# Patient Record
Sex: FEMALE | Race: BLACK OR AFRICAN AMERICAN | NOT HISPANIC OR LATINO | Employment: OTHER | URBAN - METROPOLITAN AREA
[De-identification: names, ages, dates, MRNs, and addresses within clinical notes are randomized per-mention and may not be internally consistent; named-entity substitution may affect disease eponyms.]

---

## 2018-06-06 LAB — POC BETA-HCG (QUAL): NEGATIVE

## 2018-06-11 ENCOUNTER — HISTORICAL (OUTPATIENT)
Dept: WOUND CARE | Facility: HOSPITAL | Age: 48
End: 2018-06-11

## 2018-07-12 ENCOUNTER — HISTORICAL (OUTPATIENT)
Dept: ADMINISTRATIVE | Facility: HOSPITAL | Age: 48
End: 2018-07-12

## 2018-07-12 LAB
BUN SERPL-MCNC: 10 MG/DL (ref 7–18)
CALCIUM SERPL-MCNC: 9.4 MG/DL (ref 8.5–10.1)
CHLORIDE SERPL-SCNC: 108 MMOL/L (ref 98–107)
CO2 SERPL-SCNC: 25 MMOL/L (ref 21–32)
CREAT SERPL-MCNC: 0.7 MG/DL (ref 0.6–1.3)
CREAT/UREA NIT SERPL: 14
ERYTHROCYTE [DISTWIDTH] IN BLOOD BY AUTOMATED COUNT: 15.8 % (ref 11.5–14.5)
GLUCOSE SERPL-MCNC: 89 MG/DL (ref 74–106)
HAV IGM SERPL QL IA: NONREACTIVE
HBV CORE IGM SERPL QL IA: NONREACTIVE
HBV SURFACE AG SERPL QL IA: NEGATIVE
HCT VFR BLD AUTO: 35.9 % (ref 35–46)
HCV AB SERPL QL IA: NONREACTIVE
HGB BLD-MCNC: 11.4 GM/DL (ref 12–16)
HIV 1+2 AB+HIV1 P24 AG SERPL QL IA: NONREACTIVE
MCH RBC QN AUTO: 27.1 PG (ref 26–34)
MCHC RBC AUTO-ENTMCNC: 31.8 GM/DL (ref 31–37)
MCV RBC AUTO: 85.3 FL (ref 80–100)
PLATELET # BLD AUTO: 338 X10(3)/MCL (ref 130–400)
PMV BLD AUTO: 10.7 FL (ref 7.4–10.4)
POTASSIUM SERPL-SCNC: 4.5 MMOL/L (ref 3.5–5.1)
RBC # BLD AUTO: 4.21 X10(6)/MCL (ref 4–5.2)
SODIUM SERPL-SCNC: 139 MMOL/L (ref 136–145)
T PALLIDUM AB SER QL: NONREACTIVE
WBC # SPEC AUTO: 7.6 X10(3)/MCL (ref 4.5–11)

## 2018-07-19 ENCOUNTER — HISTORICAL (OUTPATIENT)
Dept: SURGERY | Facility: HOSPITAL | Age: 48
End: 2018-07-19

## 2018-07-19 LAB
B-HCG SERPL QL: NEGATIVE
POTASSIUM SERPL-SCNC: 3.8 MMOL/L (ref 3.5–5.1)

## 2020-02-17 ENCOUNTER — HISTORICAL (OUTPATIENT)
Dept: ADMINISTRATIVE | Facility: HOSPITAL | Age: 50
End: 2020-02-17

## 2021-02-09 ENCOUNTER — HISTORICAL (OUTPATIENT)
Dept: ADMINISTRATIVE | Facility: HOSPITAL | Age: 51
End: 2021-02-09

## 2021-02-09 LAB
ABS NEUT (OLG): 2.12 X10(3)/MCL (ref 2.1–9.2)
BASOPHILS # BLD AUTO: 0.1 X10(3)/MCL (ref 0–0.2)
BASOPHILS NFR BLD AUTO: 1 %
EOSINOPHIL # BLD AUTO: 0.2 X10(3)/MCL (ref 0–0.9)
EOSINOPHIL NFR BLD AUTO: 3 %
ERYTHROCYTE [DISTWIDTH] IN BLOOD BY AUTOMATED COUNT: 15.4 % (ref 11.5–17)
FOLATE SERPL-MCNC: 12.2 NG/ML (ref 7–31.4)
HCT VFR BLD AUTO: 32 % (ref 37–47)
HGB BLD-MCNC: 9.7 GM/DL (ref 12–16)
IRON SERPL-MCNC: 71 UG/DL (ref 50–170)
LYMPHOCYTES # BLD AUTO: 2.5 X10(3)/MCL (ref 0.6–4.6)
LYMPHOCYTES NFR BLD AUTO: 45 %
MCH RBC QN AUTO: 27.3 PG (ref 27–31)
MCHC RBC AUTO-ENTMCNC: 30.3 GM/DL (ref 33–36)
MCV RBC AUTO: 90.1 FL (ref 80–94)
MONOCYTES # BLD AUTO: 0.7 X10(3)/MCL (ref 0.1–1.3)
MONOCYTES NFR BLD AUTO: 13 %
NEUTROPHILS # BLD AUTO: 2.12 X10(3)/MCL (ref 2.1–9.2)
NEUTROPHILS NFR BLD AUTO: 38 %
PLATELET # BLD AUTO: 296 X10(3)/MCL (ref 130–400)
PMV BLD AUTO: 10.4 FL (ref 9.4–12.4)
RBC # BLD AUTO: 3.55 X10(6)/MCL (ref 4.2–5.4)
VIT B12 SERPL-MCNC: 1117 PG/ML (ref 213–816)
WBC # SPEC AUTO: 5.6 X10(3)/MCL (ref 4.5–11.5)

## 2022-04-07 ENCOUNTER — HISTORICAL (OUTPATIENT)
Dept: ADMINISTRATIVE | Facility: HOSPITAL | Age: 52
End: 2022-04-07
Payer: MEDICAID

## 2022-04-24 VITALS
WEIGHT: 293 LBS | HEIGHT: 63 IN | SYSTOLIC BLOOD PRESSURE: 130 MMHG | BODY MASS INDEX: 51.91 KG/M2 | DIASTOLIC BLOOD PRESSURE: 84 MMHG

## 2022-04-30 NOTE — PROGRESS NOTES
Patient:   Dorita Moran             MRN: [id number]            FIN: [id number]               Age:   48 years     Sex:  Female     :  1970   Associated Diagnoses:   None   Author:   Courtney Her MD      Pre-Op Dx:  48 BF with AUB        [place] [facility] [place]    Reviewed resident's H&P.  Agree with plan.  Proceed with case

## 2022-04-30 NOTE — OP NOTE
Patient:   Dorita Moran             MRN: [id number]            FIN: [id number]               Age:   48 years     Sex:  Female     :  1970   Associated Diagnoses:   None   Author:   Martina Stewart MD      Date of surgery: 2018   Preop diagnosis: AUB-L  Postop diagnosis: same  Procedure: Hysteroscopy Myomectomy, D&C, Proctoscopy, Mirena IUD Insertion  Staff physicians: Courtney Her MD  Resident phyisicians: Martina Stewart MD; Eron Alcaraz MD; Karol Sotomayor MD  Anesthesia: general     IVF: 1000 mL  UOP: 150 mL    EBL: 10 mL  Distension media deficit: 150 mL  Complications: hysteroscopic entry into pararectal space; no rectal defect noted on manual palpation or inspection via proctoscopy  Findings:   EUA: 10-12 week size, anteverted uterus with good mobility and descent. Cervix anterior, nearly flush with vagina. Adequate vaginal capacity.    Hysteroscopy: initial entry with hysteroscope revealed loose areolar tissue and muscle fibers, found to have entered the pararectal space. After re-identification of the cervix, the endometrial cavity contained a 3 cm type I fibroid at the posterior fundus, a 1 cm type I fibroid at the right uterine sidewall, and a 1 cm type 0 fibroid at the left lower uterine segment.  Proctoscopy: no injury or defect of rectal mucosa    Procedure:  The patient was taken to the operating room with IV fluids running. SCDs were placed for DVT prophylaxis. General anesthesia was obtained without difficulty. The patient was prepared and draped in the normal sterile fashion in the dorsal lithotomy position. Red rubber catheritization was performed and the bladder was drained. A time out was performed.    A weighted speculum was inserted into the vagina and the cervix was visualized. A tenaculum was placed on the anterior aspect of the cervix and the cervix was gently dilated to a size 5 Hegar dilators. The hysteroscope was introduced through the cervix under direct  visualization. As noted above, loose areolar tissue and muscle fibers were noted. The vagina and cervix were re-inspected. What was first thought to be the cervix was found to be a small defect in the posterior vagina, tracking into the pararectal space. A rectal exam was performed, with moderate fullness of the pararectal space noted, but no palpable defect was noted and no blood.    The weighted speculum was inserted into the vagina and the cervix was visualized, more proximal and anterior to the previously idenitified defect. A tenaculum was placed on the anterior aspect of the cervix and the cervix was gently dilated to a size 5 Hegar dilators. The 5 mm 30 degree hysteroscope was introduced through the cervix under direct visualization. The uterine cavity was inspected and bilateral tubal ostia were identified. The findings were as above. The hysteroscope was removed and exchanged for the 7 mm, 0 degree Myosure hysteroscope. The cervix was gently dilated further to size 7 Hegar dilator. The hysteroscope with the Myosure device inserted was advanced into the uterine cavity. The type 0 and type 1 fibroids at the lower uterine segment were completely resected, and the the type 1 fundal fibroid was approximately 70% resected until nearly flush with the myometrium. The hysteroscope was removed, and endometrial curettage was performed.    The Mirenea IUD was inserted to the uterine fundus. The strings were trimmed to approximately 3 cm in length. All instruments were removed from the vagina. Tenaculum sites were noted to be hemostatic.    Rectal exam was again performed, and no defect was palpated and no blood noted on glove. The proctoscope was inserted and the rectal mucosa inspected. No defect or blood was seen. The proctoscope was removed.     The patient tolerated the procedure well. Instrument and sponge counts were correct times two. Dr. Her was present and scrubbed for the entire procedure.

## 2022-06-30 ENCOUNTER — HOSPITAL ENCOUNTER (OUTPATIENT)
Dept: RADIOLOGY | Facility: HOSPITAL | Age: 52
Discharge: HOME | End: 2022-06-30
Attending: STUDENT IN AN ORGANIZED HEALTH CARE EDUCATION/TRAINING PROGRAM
Payer: MEDICAID

## 2022-06-30 ENCOUNTER — OFFICE VISIT (OUTPATIENT)
Dept: ORTHOPEDICS | Facility: CLINIC | Age: 52
End: 2022-06-30
Payer: MEDICAID

## 2022-06-30 VITALS — HEIGHT: 63 IN | BODY MASS INDEX: 51.91 KG/M2 | WEIGHT: 293 LBS

## 2022-06-30 DIAGNOSIS — E66.9 OBESITY, UNSPECIFIED CLASSIFICATION, UNSPECIFIED OBESITY TYPE, UNSPECIFIED WHETHER SERIOUS COMORBIDITY PRESENT: ICD-10-CM

## 2022-06-30 DIAGNOSIS — G89.29 CHRONIC PAIN OF LEFT KNEE: ICD-10-CM

## 2022-06-30 DIAGNOSIS — G89.29 CHRONIC PAIN OF LEFT KNEE: Primary | ICD-10-CM

## 2022-06-30 DIAGNOSIS — M25.562 CHRONIC PAIN OF LEFT KNEE: ICD-10-CM

## 2022-06-30 DIAGNOSIS — M25.562 CHRONIC PAIN OF LEFT KNEE: Primary | ICD-10-CM

## 2022-06-30 DIAGNOSIS — M17.12 PRIMARY OSTEOARTHRITIS OF LEFT KNEE: ICD-10-CM

## 2022-06-30 PROCEDURE — 99213 OFFICE O/P EST LOW 20 MIN: CPT | Mod: PBBFAC,25 | Performed by: STUDENT IN AN ORGANIZED HEALTH CARE EDUCATION/TRAINING PROGRAM

## 2022-06-30 PROCEDURE — 99214 OFFICE O/P EST MOD 30 MIN: CPT | Mod: 25,S$PBB,, | Performed by: STUDENT IN AN ORGANIZED HEALTH CARE EDUCATION/TRAINING PROGRAM

## 2022-06-30 PROCEDURE — 1160F RVW MEDS BY RX/DR IN RCRD: CPT | Mod: CPTII,,, | Performed by: STUDENT IN AN ORGANIZED HEALTH CARE EDUCATION/TRAINING PROGRAM

## 2022-06-30 PROCEDURE — 73564 X-RAY EXAM KNEE 4 OR MORE: CPT | Mod: TC,LT

## 2022-06-30 PROCEDURE — 3008F BODY MASS INDEX DOCD: CPT | Mod: CPTII,,, | Performed by: STUDENT IN AN ORGANIZED HEALTH CARE EDUCATION/TRAINING PROGRAM

## 2022-06-30 PROCEDURE — 3008F PR BODY MASS INDEX (BMI) DOCUMENTED: ICD-10-PCS | Mod: CPTII,,, | Performed by: STUDENT IN AN ORGANIZED HEALTH CARE EDUCATION/TRAINING PROGRAM

## 2022-06-30 PROCEDURE — 20610 LARGE JOINT ASPIRATION/INJECTION: L KNEE: ICD-10-PCS | Mod: S$PBB,LT,, | Performed by: STUDENT IN AN ORGANIZED HEALTH CARE EDUCATION/TRAINING PROGRAM

## 2022-06-30 PROCEDURE — 1160F PR REVIEW ALL MEDS BY PRESCRIBER/CLIN PHARMACIST DOCUMENTED: ICD-10-PCS | Mod: CPTII,,, | Performed by: STUDENT IN AN ORGANIZED HEALTH CARE EDUCATION/TRAINING PROGRAM

## 2022-06-30 PROCEDURE — 20610 DRAIN/INJ JOINT/BURSA W/O US: CPT | Mod: PBBFAC,LT | Performed by: STUDENT IN AN ORGANIZED HEALTH CARE EDUCATION/TRAINING PROGRAM

## 2022-06-30 PROCEDURE — 1159F PR MEDICATION LIST DOCUMENTED IN MEDICAL RECORD: ICD-10-PCS | Mod: CPTII,,, | Performed by: STUDENT IN AN ORGANIZED HEALTH CARE EDUCATION/TRAINING PROGRAM

## 2022-06-30 PROCEDURE — 1159F MED LIST DOCD IN RCRD: CPT | Mod: CPTII,,, | Performed by: STUDENT IN AN ORGANIZED HEALTH CARE EDUCATION/TRAINING PROGRAM

## 2022-06-30 PROCEDURE — 99214 PR OFFICE/OUTPT VISIT, EST, LEVL IV, 30-39 MIN: ICD-10-PCS | Mod: 25,S$PBB,, | Performed by: STUDENT IN AN ORGANIZED HEALTH CARE EDUCATION/TRAINING PROGRAM

## 2022-06-30 RX ORDER — LIDOCAINE HYDROCHLORIDE 10 MG/ML
5 INJECTION, SOLUTION EPIDURAL; INFILTRATION; INTRACAUDAL; PERINEURAL
Status: DISCONTINUED | OUTPATIENT
Start: 2022-06-30 | End: 2022-06-30 | Stop reason: HOSPADM

## 2022-06-30 RX ORDER — CARIPRAZINE 3 MG/1
1 CAPSULE, GELATIN COATED ORAL DAILY
COMMUNITY
Start: 2022-06-22

## 2022-06-30 RX ORDER — ESZOPICLONE 1 MG/1
3 TABLET, FILM COATED ORAL
COMMUNITY

## 2022-06-30 RX ORDER — DOXEPIN HYDROCHLORIDE 150 MG/1
150 CAPSULE ORAL NIGHTLY
COMMUNITY
Start: 2022-06-21

## 2022-06-30 RX ORDER — TRIAMCINOLONE ACETONIDE 40 MG/ML
40 INJECTION, SUSPENSION INTRA-ARTICULAR; INTRAMUSCULAR
Status: DISCONTINUED | OUTPATIENT
Start: 2022-06-30 | End: 2022-06-30 | Stop reason: HOSPADM

## 2022-06-30 RX ORDER — LORATADINE 10 MG/1
10 TABLET ORAL NIGHTLY
COMMUNITY
Start: 2022-06-22

## 2022-06-30 RX ORDER — BUPROPION HYDROCHLORIDE 300 MG/1
300 TABLET ORAL
COMMUNITY

## 2022-06-30 RX ORDER — CHLORHEXIDINE GLUCONATE ORAL RINSE 1.2 MG/ML
SOLUTION DENTAL
COMMUNITY
Start: 2022-06-15

## 2022-06-30 RX ORDER — LURASIDONE HYDROCHLORIDE 80 MG/1
80 TABLET, FILM COATED ORAL
Status: ON HOLD | COMMUNITY
End: 2023-12-18 | Stop reason: HOSPADM

## 2022-06-30 RX ORDER — DEXAMETHASONE 4 MG/1
1 TABLET ORAL 2 TIMES DAILY
COMMUNITY
Start: 2022-06-22

## 2022-06-30 RX ORDER — OXCARBAZEPINE 300 MG/1
300 TABLET, FILM COATED ORAL
COMMUNITY

## 2022-06-30 RX ORDER — FLUTICASONE PROPIONATE 50 MCG
1 SPRAY, SUSPENSION (ML) NASAL NIGHTLY
COMMUNITY
Start: 2022-06-22

## 2022-06-30 RX ORDER — HYDROCHLOROTHIAZIDE 25 MG/1
25 TABLET ORAL EVERY MORNING
COMMUNITY
Start: 2022-06-22

## 2022-06-30 RX ORDER — VENLAFAXINE HYDROCHLORIDE 150 MG/1
150 CAPSULE, EXTENDED RELEASE ORAL DAILY
COMMUNITY
Start: 2022-06-21

## 2022-06-30 RX ORDER — CYCLOBENZAPRINE HCL 10 MG
10 TABLET ORAL 3 TIMES DAILY PRN
Status: ON HOLD | COMMUNITY
Start: 2022-05-23 | End: 2023-12-18 | Stop reason: HOSPADM

## 2022-06-30 RX ORDER — FLUTICASONE FUROATE AND VILANTEROL 200; 25 UG/1; UG/1
1 POWDER RESPIRATORY (INHALATION)
COMMUNITY

## 2022-06-30 RX ORDER — MECLIZINE HCL 25MG 25 MG/1
32 TABLET, CHEWABLE ORAL 3 TIMES DAILY PRN
COMMUNITY

## 2022-06-30 RX ORDER — MONTELUKAST SODIUM 10 MG/1
TABLET ORAL
COMMUNITY
Start: 2022-06-22

## 2022-06-30 RX ORDER — ALPRAZOLAM 1 MG/1
1 TABLET ORAL 3 TIMES DAILY
COMMUNITY
Start: 2022-06-23

## 2022-06-30 RX ADMIN — TRIAMCINOLONE ACETONIDE 40 MG: 40 INJECTION, SUSPENSION INTRA-ARTICULAR; INTRAMUSCULAR at 08:06

## 2022-06-30 RX ADMIN — LIDOCAINE HYDROCHLORIDE 5 ML: 10 INJECTION, SOLUTION EPIDURAL; INFILTRATION; INTRACAUDAL; PERINEURAL at 08:06

## 2022-06-30 NOTE — PROGRESS NOTES
"  Subjective:       Existing pt with new compliant   Patient ID: Dorita Moran is a 52 y.o. female. Non-smoker. 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        Chief Complaint: Pain of the Left Knee    HPI:    Left aching, stabbing and throbbing medial, anterior knee pain.   Injury: no known injury  Onset: several months ago worsening over time  Pain level: 9/10  Modifying Factors: worse with activity and increased at night  Associated Symptoms: crepitus and "giving out"   Activity: sedentary with light activity and pain moderately interferes with ADLs   Previous Treatments:  RX NSAIDs without significant relief.  PMH: negative for contraindications for NSAID use  Family History: unknown     Note:  Patient is currently also seen in our clinic for carpal tunnel syndrome      Current Outpatient Medications:     ALPRAZolam (XANAX) 1 MG tablet, Take 1 mg by mouth 3 (three) times daily., Disp: , Rfl:     buPROPion (WELLBUTRIN XL) 300 MG 24 hr tablet, Take 300 mg by mouth., Disp: , Rfl:     chlorhexidine (PERIDEX) 0.12 % solution, SMARTSIG:By Mouth, Disp: , Rfl:     cyclobenzaprine (FLEXERIL) 10 MG tablet, Take 10 mg by mouth 3 (three) times daily as needed., Disp: , Rfl:     doxepin (SINEQUAN) 150 MG Cap, Take 150 mg by mouth nightly., Disp: , Rfl:     eszopiclone (LUNESTA) 1 MG Tab, Take 3 mg by mouth., Disp: , Rfl:     FLOVENT  mcg/actuation inhaler, Inhale 1 puff into the lungs 2 (two) times daily., Disp: , Rfl:     fluticasone furoate-vilanteroL (BREO) 200-25 mcg/dose DsDv diskus inhaler, Inhale 1 puff into the lungs., Disp: , Rfl:     fluticasone propionate (FLONASE) 50 mcg/actuation nasal spray, 1 spray by Each Nostril route nightly., Disp: , Rfl:     hydroCHLOROthiazide (HYDRODIURIL) 25 MG tablet, Take 25 mg by mouth every morning., Disp: , Rfl:     ipratropium-albuteroL (COMBIVENT)  mcg/actuation inhaler, Inhale 1 puff into the lungs., Disp: , Rfl:     " "loratadine (CLARITIN) 10 mg tablet, Take 10 mg by mouth nightly., Disp: , Rfl:     lurasidone (LATUDA) 80 mg Tab tablet, Take 80 mg by mouth., Disp: , Rfl:     meclizine (ANTIVERT) 25 MG tablet, Take 32 mg by mouth 3 (three) times daily as needed., Disp: , Rfl:     montelukast (SINGULAIR) 10 mg tablet, SMARTSI Tablet(s) By Mouth Every Evening, Disp: , Rfl:     OXcarbazepine (TRILEPTAL) 300 MG Tab, Take 300 mg by mouth., Disp: , Rfl:     venlafaxine (EFFEXOR-XR) 150 MG Cp24, Take 150 mg by mouth once daily., Disp: , Rfl:     VRAYLAR 3 mg Cap, Take 1 capsule by mouth once daily., Disp: , Rfl:     Review of patient's allergies indicates:  No Known Allergies    No results found for: LABA1C   Body mass index is 61.89 kg/m².   Vitals:    22 0818   Weight: (!) 158.5 kg (349 lb 6.4 oz)   Height: 5' 3" (1.6 m)   PainSc:   9        ROS   Review of Systems:  A ten-point review of systems was performed and is negative, except as mentioned above.        Objective:      General: well developed; well nourished; cooperative  PSYCH: alert and oriented with appropriate mood and affect  SKIN: inspection and palpation of skin and soft tissue normal;  CV: vascular integrity noted; +2 symmetrical pulses  Resp: Normal work of breathing, quiet breathing    Left knee   Inspection:   Antalgic gait/station; full weight bearing; normal alignment; mild swelling; no erythema; no bruising; Quad deconditioning noted   Palpation:  Medial joint ; Crepitus: present  ROM:   Active, passive Flexion (0-140): 110,110  Active, passive Extension : 0,0  Strength: Flexion 4/5, Extension 4/5  Special Tests:  Ballotable Effusion: Negative  Fluid Wave: Negative   Anterior Drawer: Negative  Lachman: Negative  Pivot Shift: Negative   Posterior Sag Sign: Negative  Posterior Drawer: Negative  Dial Test: not tested   Varus Stress: LCL stable at 0 and 30 degrees   Valgus Stress: MCL stable at 0 and 30 degrees   Patellar Grind: Negative "   Patella Tracking: normal  Patella Crepitation: none  Sosa: Negative  Apleys Compression: Negative  Thessaly: Negative   Noble Compression: not tested  Gabriel: not tested   Neurovascular: Intact; 2+ distal pulse, sensation intact to light touch  Reflexes: 2+          Imaging:  X-ray reviewed and independently interpreted by me.  Discussed with patient.    As per my interpretation of this patient's left knee plain film, there is medial joint space narrowing and marginal osteophyte present tricompartmentally.  No fracture or dislocation noted      Assessment:             1. Chronic pain of left knee    2. Primary osteoarthritis of left knee    3. Obesity, unspecified classification, unspecified obesity type, unspecified whether serious comorbidity present          Plan:       Orders Placed This Encounter    Large Joint Aspiration/Injection: L knee    X-Ray Knee Complete 4 or More Views Left     52 y.o.  patient presenting for evaluation of Pain of the Left Knee   secondary to osteoarthritis complicated by obesity.     moderately exacerbated.   Radiological studies ordered and interpreted by me, my independent interpretation attached, reviewed my findings with patient and showed them their images  CSI performed today to L knee, consented, site marked, time out performed, tolerated well, NVI following injection;  Activity as tolerated, behavior modification encouraged  Formal PT x 12wks, 3 times weekly, HEP daily, Ice/Heat prn, NSAIDs/Tylenol/topical anasthetics PRN, med precautions given,   Follow up 4 months     BMI today: Body mass index is 61.89 kg/m².     Goal BMI: <40    Exercise prescription given to patient to increase fitness and facilitate weight loss. Prescription to include goals of 150 to 300 minutes/week of moderate intensity exercise include activities like brisk walking, light biking or water exercise and/or vigorous activity 75 to 150 minutes/week to include activities like jogging, swimming, fast  bicycling or tennis. They were given goal of 7,500-10,000 steps per day. Muscle strength training was also discussed and recommended goal of 2 to 3 days a week to include activities like activities with elastic bands, body weight exercises or dumbbells.        Large Joint Aspiration/Injection: L knee    Date/Time: 6/30/2022 8:30 AM  Performed by: Sheree Santacruz MD  Authorized by: Sheree Santacruz MD     Consent Done?:  Yes (Written)  Indications:  Pain  Site marked: the procedure site was marked    Timeout: prior to procedure the correct patient, procedure, and site was verified    Prep: patient was prepped and draped in usual sterile fashion      Local anesthesia used?: Yes    Local anesthetic:  Topical anesthetic    Details:  Needle Size:  21 G  Ultrasonic Guidance for needle placement?: No    Approach:  Anterolateral  Location:  Knee  Site:  L knee  Medications:  5 mL LIDOcaine (PF) 10 mg/ml (1%) 10 mg/mL (1 %); 40 mg triamcinolone acetonide 40 mg/mL  Patient tolerance:  Patient tolerated the procedure well with no immediate complications     Procedure: 5 mL of 1% lidocaine plain with 40 mg of Kenalog injected into each location.    RISKS: Possible complications with injection include bleeding, infection (0.01%), pain, allergic reaction to medicine or preservatives within the medicine    Post Procedure: Patient alert, moving all extremities. Good peripheral pulses, no signs of vascular compromise, range of motion intact. Patient tolerated procedure well. Aftercare instructions were given to patient at time of discharge. Relative rest for 3 days - avoiding excessive activity. Place ice on area for 15 minutes every 4 to 6 hours. Tylenol 1000mg twice a day or ibuprofen 600 mg three times per day for next 3-4 days if not on medication already. Avoid excessive activity for next 3 to 4 weeks. ER precautions for fever, severe joint pain, or allergic reaction or other new symptoms related to joint  injection.        Sheree Santacruz MD

## 2022-08-18 ENCOUNTER — HOSPITAL ENCOUNTER (OUTPATIENT)
Dept: RADIOLOGY | Facility: HOSPITAL | Age: 52
Discharge: HOME | End: 2022-08-18
Payer: MEDICAID

## 2022-08-18 DIAGNOSIS — M51.9 LUMBAR DISC DISEASE: Primary | ICD-10-CM

## 2022-08-18 DIAGNOSIS — M51.9 LUMBAR DISC DISEASE: ICD-10-CM

## 2022-09-21 ENCOUNTER — HISTORICAL (OUTPATIENT)
Dept: ADMINISTRATIVE | Facility: HOSPITAL | Age: 52
End: 2022-09-21
Payer: MEDICAID

## 2022-11-01 ENCOUNTER — OFFICE VISIT (OUTPATIENT)
Dept: ORTHOPEDICS | Facility: CLINIC | Age: 52
End: 2022-11-01
Payer: MEDICAID

## 2022-11-01 VITALS
HEART RATE: 85 BPM | BODY MASS INDEX: 55.32 KG/M2 | RESPIRATION RATE: 16 BRPM | WEIGHT: 293 LBS | HEIGHT: 61 IN | SYSTOLIC BLOOD PRESSURE: 110 MMHG | DIASTOLIC BLOOD PRESSURE: 51 MMHG

## 2022-11-01 DIAGNOSIS — E66.9 OBESITY, UNSPECIFIED CLASSIFICATION, UNSPECIFIED OBESITY TYPE, UNSPECIFIED WHETHER SERIOUS COMORBIDITY PRESENT: ICD-10-CM

## 2022-11-01 DIAGNOSIS — G89.29 CHRONIC PAIN OF LEFT KNEE: Primary | ICD-10-CM

## 2022-11-01 DIAGNOSIS — M17.12 PRIMARY OSTEOARTHRITIS OF LEFT KNEE: ICD-10-CM

## 2022-11-01 DIAGNOSIS — M25.562 CHRONIC PAIN OF LEFT KNEE: Primary | ICD-10-CM

## 2022-11-01 PROCEDURE — 20610 DRAIN/INJ JOINT/BURSA W/O US: CPT | Mod: PBBFAC,LT | Performed by: STUDENT IN AN ORGANIZED HEALTH CARE EDUCATION/TRAINING PROGRAM

## 2022-11-01 PROCEDURE — 3078F DIAST BP <80 MM HG: CPT | Mod: CPTII,,, | Performed by: STUDENT IN AN ORGANIZED HEALTH CARE EDUCATION/TRAINING PROGRAM

## 2022-11-01 PROCEDURE — 3078F PR MOST RECENT DIASTOLIC BLOOD PRESSURE < 80 MM HG: ICD-10-PCS | Mod: CPTII,,, | Performed by: STUDENT IN AN ORGANIZED HEALTH CARE EDUCATION/TRAINING PROGRAM

## 2022-11-01 PROCEDURE — 3074F PR MOST RECENT SYSTOLIC BLOOD PRESSURE < 130 MM HG: ICD-10-PCS | Mod: CPTII,,, | Performed by: STUDENT IN AN ORGANIZED HEALTH CARE EDUCATION/TRAINING PROGRAM

## 2022-11-01 PROCEDURE — 99214 PR OFFICE/OUTPT VISIT, EST, LEVL IV, 30-39 MIN: ICD-10-PCS | Mod: 25,S$PBB,, | Performed by: STUDENT IN AN ORGANIZED HEALTH CARE EDUCATION/TRAINING PROGRAM

## 2022-11-01 PROCEDURE — 3074F SYST BP LT 130 MM HG: CPT | Mod: CPTII,,, | Performed by: STUDENT IN AN ORGANIZED HEALTH CARE EDUCATION/TRAINING PROGRAM

## 2022-11-01 PROCEDURE — 99214 OFFICE O/P EST MOD 30 MIN: CPT | Mod: 25,S$PBB,, | Performed by: STUDENT IN AN ORGANIZED HEALTH CARE EDUCATION/TRAINING PROGRAM

## 2022-11-01 PROCEDURE — 1160F PR REVIEW ALL MEDS BY PRESCRIBER/CLIN PHARMACIST DOCUMENTED: ICD-10-PCS | Mod: CPTII,,, | Performed by: STUDENT IN AN ORGANIZED HEALTH CARE EDUCATION/TRAINING PROGRAM

## 2022-11-01 PROCEDURE — 20610: ICD-10-PCS | Mod: S$PBB,LT,, | Performed by: STUDENT IN AN ORGANIZED HEALTH CARE EDUCATION/TRAINING PROGRAM

## 2022-11-01 PROCEDURE — 99215 OFFICE O/P EST HI 40 MIN: CPT | Mod: PBBFAC | Performed by: STUDENT IN AN ORGANIZED HEALTH CARE EDUCATION/TRAINING PROGRAM

## 2022-11-01 PROCEDURE — 1159F MED LIST DOCD IN RCRD: CPT | Mod: CPTII,,, | Performed by: STUDENT IN AN ORGANIZED HEALTH CARE EDUCATION/TRAINING PROGRAM

## 2022-11-01 PROCEDURE — 1159F PR MEDICATION LIST DOCUMENTED IN MEDICAL RECORD: ICD-10-PCS | Mod: CPTII,,, | Performed by: STUDENT IN AN ORGANIZED HEALTH CARE EDUCATION/TRAINING PROGRAM

## 2022-11-01 PROCEDURE — 1160F RVW MEDS BY RX/DR IN RCRD: CPT | Mod: CPTII,,, | Performed by: STUDENT IN AN ORGANIZED HEALTH CARE EDUCATION/TRAINING PROGRAM

## 2022-11-01 RX ADMIN — Medication 20 MG: at 09:11

## 2022-11-01 RX ADMIN — Medication 20 MG: at 08:11

## 2022-11-01 NOTE — PROGRESS NOTES
"  Subjective:       Existing pt, last seen 06/30/2022, prior note reviewed.  Patient received CSI to her knee at the last visit    Patient ID: Dorita Moran is a 52 y.o. female. Non-smoker. 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        Chief Complaint: Pain of the Left Knee and Follow-up    HPI:    Left aching, stabbing and throbbing medial, anterior knee pain.  Patient has steroid injection at her last visit in June 2022, states she had only about 1 month of relief.  She is having excruciating pain that is interfering with ADLs.  She has been attending physical therapy which she states is worsening her knee pain.  Injury: no known injury  Onset: several months ago worsening over time  Pain level: 9/10  Modifying Factors: worse with activity and increased at night  Associated Symptoms: crepitus and "giving out"   Activity: sedentary with light activity and pain moderately interferes with ADLs   Previous Treatments:  RX NSAIDs without significant relief.  PMH: negative for contraindications for NSAID use  Family History: unknown     Note:  Patient is currently also seen in our clinic for carpal tunnel syndrome      Current Outpatient Medications:     ALPRAZolam (XANAX) 1 MG tablet, Take 1 mg by mouth 3 (three) times daily., Disp: , Rfl:     buPROPion (WELLBUTRIN XL) 300 MG 24 hr tablet, Take 300 mg by mouth., Disp: , Rfl:     chlorhexidine (PERIDEX) 0.12 % solution, SMARTSIG:By Mouth, Disp: , Rfl:     cyclobenzaprine (FLEXERIL) 10 MG tablet, Take 10 mg by mouth 3 (three) times daily as needed., Disp: , Rfl:     doxepin (SINEQUAN) 150 MG Cap, Take 150 mg by mouth nightly., Disp: , Rfl:     eszopiclone (LUNESTA) 1 MG Tab, Take 3 mg by mouth., Disp: , Rfl:     FLOVENT  mcg/actuation inhaler, Inhale 1 puff into the lungs 2 (two) times daily., Disp: , Rfl:     fluticasone furoate-vilanteroL (BREO) 200-25 mcg/dose DsDv diskus inhaler, Inhale 1 puff into the lungs., Disp: , Rfl:     " "fluticasone propionate (FLONASE) 50 mcg/actuation nasal spray, 1 spray by Each Nostril route nightly., Disp: , Rfl:     hydroCHLOROthiazide (HYDRODIURIL) 25 MG tablet, Take 25 mg by mouth every morning., Disp: , Rfl:     ipratropium-albuteroL (COMBIVENT)  mcg/actuation inhaler, Inhale 1 puff into the lungs., Disp: , Rfl:     loratadine (CLARITIN) 10 mg tablet, Take 10 mg by mouth nightly., Disp: , Rfl:     lurasidone (LATUDA) 80 mg Tab tablet, Take 80 mg by mouth., Disp: , Rfl:     meclizine (ANTIVERT) 25 MG tablet, Take 32 mg by mouth 3 (three) times daily as needed., Disp: , Rfl:     montelukast (SINGULAIR) 10 mg tablet, SMARTSI Tablet(s) By Mouth Every Evening, Disp: , Rfl:     OXcarbazepine (TRILEPTAL) 300 MG Tab, Take 300 mg by mouth., Disp: , Rfl:     venlafaxine (EFFEXOR-XR) 150 MG Cp24, Take 150 mg by mouth once daily., Disp: , Rfl:     VRAYLAR 3 mg Cap, Take 1 capsule by mouth once daily., Disp: , Rfl:     Review of patient's allergies indicates:   Allergen Reactions    Naproxen Hives    Tramadol Hives and Itching       No results found for: LABA1C   Body mass index is 66.13 kg/m².   Vitals:    22 0828   BP: (!) 110/51   Pulse: 85   Resp: 16   Weight: (!) 158.8 kg (350 lb)   Height: 5' 1" (1.549 m)   PainSc: 10-Worst pain ever        ROS   Review of Systems:  A ten-point review of systems was performed and is negative, except as mentioned above.        Objective:      General: well developed; well nourished; cooperative  PSYCH: alert and oriented with appropriate mood and affect  SKIN: inspection and palpation of skin and soft tissue normal;  CV: vascular integrity noted; +2 symmetrical pulses  Resp: Normal work of breathing, quiet breathing    Left knee   Inspection:   Antalgic gait/station; full weight bearing; normal alignment; mild swelling; no erythema; no bruising; Quad deconditioning noted   Palpation:  Medial joint ; Crepitus: present  ROM:   Active, passive Flexion " (0-140): 110,110  Active, passive Extension : 0,0  Strength: Flexion 4/5, Extension 4/5  Special Tests:  Ballotable Effusion: Negative  Fluid Wave: Negative   Anterior Drawer: Negative  Lachman: Negative  Pivot Shift: Negative   Posterior Sag Sign: Negative  Posterior Drawer: Negative  Dial Test: not tested   Varus Stress: LCL stable at 0 and 30 degrees   Valgus Stress: MCL stable at 0 and 30 degrees   Patellar Grind: Negative   Patella Tracking: normal  Patella Crepitation: none  Sosa: Negative  Apleys Compression: Negative  Thessaly: Negative   Noble Compression: not tested  Gabriel: not tested   Neurovascular: Intact; 2+ distal pulse, sensation intact to light touch  Reflexes: 2+          Imaging:  X-ray reviewed and independently interpreted by me.  Discussed with patient.    As per my interpretation of this patient's left knee plain film, there is medial joint space narrowing and marginal osteophyte present tricompartmentally.  No fracture or dislocation noted      Assessment:             1. Chronic pain of left knee    2. Primary osteoarthritis of left knee    3. Obesity, unspecified classification, unspecified obesity type, unspecified whether serious comorbidity present          Plan:            52 y.o.  patient presenting for evaluation of Pain of the Left Knee and Follow-up   secondary to osteoarthritis complicated by obesity.     moderately exacerbated.   Radiological studies ordered and interpreted by me, my independent interpretation attached, reviewed my findings with patient and showed them their images  VSI performed today to L knee, consented, site marked, time out performed, tolerated well, NVI following injection; patient given Hyalgan injection to the left knee.  See procedure note for details  Activity as tolerated, behavior modification encouraged  Encouraged HEP daily, Ice/Heat prn, NSAIDs/Tylenol/topical anasthetics PRN, med precautions given,   Follow up in 1 week and 2 weeks for Hyalgan  injections 2. And 3.    Of note, patient would like follow-up appointment for her carpal tunnel syndrome she is also currently seen in our clinic for.    BMI today: Body mass index is 66.13 kg/m².     Goal BMI: <40    Exercise prescription given to patient to increase fitness and facilitate weight loss. Prescription to include goals of 150 to 300 minutes/week of moderate intensity exercise include activities like brisk walking, light biking or water exercise and/or vigorous activity 75 to 150 minutes/week to include activities like jogging, swimming, fast bicycling or tennis. They were given goal of 7,500-10,000 steps per day. Muscle strength training was also discussed and recommended goal of 2 to 3 days a week to include activities like activities with elastic bands, body weight exercises or dumbbells.        Large Joint Aspiration/Injection: L knee 1/3    Date/Time: 11/1/2022 9:10 AM  Performed by: Sheree Santacruz MD  Authorized by: Sheree Santacruz MD     Consent Done?:  Yes (Written)  Indications:  Pain and arthritis  Site marked: the procedure site was marked    Timeout: prior to procedure the correct patient, procedure, and site was verified    Prep: patient was prepped and draped in usual sterile fashion      Local anesthesia used?: Yes    Local anesthetic:  Topical anesthetic    Details:  Needle Size:  21 G  Ultrasonic Guidance for needle placement?: No    Approach:  Anterolateral  Location:  Knee  Site:  L knee  Medications:  20 mg sodium hyaluronate (supartz) 10 mg/mL; 20 mg sodium hyaluronate (supartz) 10 mg/mL; 20 mg Hyalgan 10 MG/ML IATC SYRG  Patient tolerance:  Patient tolerated the procedure well with no immediate complications     Procedure: Each joint injected: with Hyalgan 10mg/mL, 2mL.     Post Procedure: Patient reports improvement in pain and ROM. Patient alert, moving all extremities. Good peripheral pulses, no signs of vascular compromise, range of motion intact. Patient tolerated procedure  well. Aftercare instructions were given to patient at time of discharge. Relative rest for 3 days - avoiding excessive activity. Place ice on area for 15 minutes every 4 to 6 hours. Tylenol 1000mg twice a day or ibuprofen 600 mg three times per day for next 3-4 days if not on medication already. Protect the area for the next 1-8 hours if anesthetic was used. Avoid excessive activity for next 3 to 4 weeks. ER precautions for fever, severe joint pain, or allergic reaction or other new symptoms related to joint injection.    Sheree Santacruz MD

## 2022-11-08 ENCOUNTER — PROCEDURE VISIT (OUTPATIENT)
Dept: ORTHOPEDICS | Facility: CLINIC | Age: 52
End: 2022-11-08
Payer: MEDICAID

## 2022-11-08 VITALS — BODY MASS INDEX: 55.32 KG/M2 | WEIGHT: 293 LBS | HEIGHT: 61 IN

## 2022-11-08 DIAGNOSIS — M17.12 PRIMARY OSTEOARTHRITIS OF LEFT KNEE: Primary | ICD-10-CM

## 2022-11-08 PROCEDURE — 99499 UNLISTED E&M SERVICE: CPT | Mod: S$PBB,,, | Performed by: STUDENT IN AN ORGANIZED HEALTH CARE EDUCATION/TRAINING PROGRAM

## 2022-11-08 PROCEDURE — 20610 DRAIN/INJ JOINT/BURSA W/O US: CPT | Mod: PBBFAC,LT | Performed by: STUDENT IN AN ORGANIZED HEALTH CARE EDUCATION/TRAINING PROGRAM

## 2022-11-08 PROCEDURE — 20610 LARGE JOINT ASPIRATION/INJECTION: L KNEE: ICD-10-PCS | Mod: S$PBB,LT,, | Performed by: STUDENT IN AN ORGANIZED HEALTH CARE EDUCATION/TRAINING PROGRAM

## 2022-11-08 PROCEDURE — 99499 NO LOS: ICD-10-PCS | Mod: S$PBB,,, | Performed by: STUDENT IN AN ORGANIZED HEALTH CARE EDUCATION/TRAINING PROGRAM

## 2022-11-08 RX ADMIN — Medication 20 MG: at 08:11

## 2022-11-08 RX ADMIN — Medication 20 MG: at 07:11

## 2022-11-08 NOTE — PROCEDURES
Large Joint Aspiration/Injection: L knee    Date/Time: 11/8/2022 7:30 AM  Performed by: Sheree Santacruz MD  Authorized by: Sheree Santacruz MD     Consent Done?:  Yes (Written)  Indications:  Pain and arthritis  Site marked: the procedure site was marked    Timeout: prior to procedure the correct patient, procedure, and site was verified    Prep: patient was prepped and draped in usual sterile fashion      Local anesthesia used?: Yes    Local anesthetic:  Topical anesthetic    Details:  Needle Size:  21 G  Ultrasonic Guidance for needle placement?: No    Approach:  Anterolateral  Location:  Knee  Site:  L knee  Medications:  20 mg Hyalgan 10 MG/ML IATC SYRG  Patient tolerance:  Patient tolerated the procedure well with no immediate complications     Procedure: Each joint injected: with Hyalgan 10mg/mL, 2mL.     Post Procedure: Patient reports improvement in pain and ROM. Patient alert, moving all extremities. Good peripheral pulses, no signs of vascular compromise, range of motion intact. Patient tolerated procedure well. Aftercare instructions were given to patient at time of discharge. Relative rest for 3 days - avoiding excessive activity. Place ice on area for 15 minutes every 4 to 6 hours. Tylenol 1000mg twice a day or ibuprofen 600 mg three times per day for next 3-4 days if not on medication already. Protect the area for the next 1-8 hours if anesthetic was used. Avoid excessive activity for next 3 to 4 weeks. ER precautions for fever, severe joint pain, or allergic reaction or other new symptoms related to joint injection.

## 2022-11-15 ENCOUNTER — PROCEDURE VISIT (OUTPATIENT)
Dept: ORTHOPEDICS | Facility: CLINIC | Age: 52
End: 2022-11-15
Payer: MEDICAID

## 2022-11-15 VITALS — HEIGHT: 61 IN | BODY MASS INDEX: 55.32 KG/M2 | WEIGHT: 293 LBS

## 2022-11-15 DIAGNOSIS — M17.12 PRIMARY OSTEOARTHRITIS OF LEFT KNEE: Primary | ICD-10-CM

## 2022-11-15 PROCEDURE — 99499 UNLISTED E&M SERVICE: CPT | Mod: S$PBB,,, | Performed by: STUDENT IN AN ORGANIZED HEALTH CARE EDUCATION/TRAINING PROGRAM

## 2022-11-15 PROCEDURE — 99499 NO LOS: ICD-10-PCS | Mod: S$PBB,,, | Performed by: STUDENT IN AN ORGANIZED HEALTH CARE EDUCATION/TRAINING PROGRAM

## 2022-11-15 PROCEDURE — 20610 LARGE JOINT ASPIRATION/INJECTION: L KNEE: ICD-10-PCS | Mod: S$PBB,LT,, | Performed by: STUDENT IN AN ORGANIZED HEALTH CARE EDUCATION/TRAINING PROGRAM

## 2022-11-15 PROCEDURE — 20610 DRAIN/INJ JOINT/BURSA W/O US: CPT | Mod: PBBFAC | Performed by: STUDENT IN AN ORGANIZED HEALTH CARE EDUCATION/TRAINING PROGRAM

## 2022-11-15 RX ADMIN — Medication 20 MG: at 07:11

## 2022-11-15 NOTE — PROCEDURES
Large Joint Aspiration/Injection: L knee    Date/Time: 11/15/2022 7:30 AM  Performed by: Sheree Santacruz MD  Authorized by: Sheree Santacruz MD     Consent Done?:  Yes (Written)  Indications:  Pain and arthritis  Site marked: the procedure site was marked    Timeout: prior to procedure the correct patient, procedure, and site was verified    Prep: patient was prepped and draped in usual sterile fashion      Local anesthesia used?: Yes    Local anesthetic:  Topical anesthetic    Details:  Needle Size:  21 G  Ultrasonic Guidance for needle placement?: No    Approach:  Anterolateral  Location:  Knee  Site:  L knee  Medications:  20 mg Hyalgan 10 MG/ML IATC SYRG  Patient tolerance:  Patient tolerated the procedure well with no immediate complications     Procedure: Each joint injected: with Hyalgan 10mg/mL, 2mL.     Post Procedure: Patient reports improvement in pain and ROM. Patient alert, moving all extremities. Good peripheral pulses, no signs of vascular compromise, range of motion intact. Patient tolerated procedure well. Aftercare instructions were given to patient at time of discharge. Relative rest for 3 days - avoiding excessive activity. Place ice on area for 15 minutes every 4 to 6 hours. Tylenol 1000mg twice a day or ibuprofen 600 mg three times per day for next 3-4 days if not on medication already. Protect the area for the next 1-8 hours if anesthetic was used. Avoid excessive activity for next 3 to 4 weeks. ER precautions for fever, severe joint pain, or allergic reaction or other new symptoms related to joint injection.

## 2022-11-17 ENCOUNTER — OFFICE VISIT (OUTPATIENT)
Dept: ORTHOPEDICS | Facility: CLINIC | Age: 52
End: 2022-11-17
Payer: MEDICAID

## 2022-11-17 VITALS
OXYGEN SATURATION: 100 % | HEART RATE: 69 BPM | RESPIRATION RATE: 20 BRPM | HEIGHT: 61 IN | BODY MASS INDEX: 55.32 KG/M2 | DIASTOLIC BLOOD PRESSURE: 83 MMHG | SYSTOLIC BLOOD PRESSURE: 148 MMHG | WEIGHT: 293 LBS

## 2022-11-17 DIAGNOSIS — G56.03 CARPAL TUNNEL SYNDROME, BILATERAL: Primary | ICD-10-CM

## 2022-11-17 DIAGNOSIS — E66.9 OBESITY, UNSPECIFIED CLASSIFICATION, UNSPECIFIED OBESITY TYPE, UNSPECIFIED WHETHER SERIOUS COMORBIDITY PRESENT: ICD-10-CM

## 2022-11-17 PROCEDURE — 3077F PR MOST RECENT SYSTOLIC BLOOD PRESSURE >= 140 MM HG: ICD-10-PCS | Mod: CPTII,,, | Performed by: STUDENT IN AN ORGANIZED HEALTH CARE EDUCATION/TRAINING PROGRAM

## 2022-11-17 PROCEDURE — 3008F PR BODY MASS INDEX (BMI) DOCUMENTED: ICD-10-PCS | Mod: CPTII,,, | Performed by: STUDENT IN AN ORGANIZED HEALTH CARE EDUCATION/TRAINING PROGRAM

## 2022-11-17 PROCEDURE — 3077F SYST BP >= 140 MM HG: CPT | Mod: CPTII,,, | Performed by: STUDENT IN AN ORGANIZED HEALTH CARE EDUCATION/TRAINING PROGRAM

## 2022-11-17 PROCEDURE — 99214 OFFICE O/P EST MOD 30 MIN: CPT | Mod: 25,S$PBB,, | Performed by: STUDENT IN AN ORGANIZED HEALTH CARE EDUCATION/TRAINING PROGRAM

## 2022-11-17 PROCEDURE — 3079F PR MOST RECENT DIASTOLIC BLOOD PRESSURE 80-89 MM HG: ICD-10-PCS | Mod: CPTII,,, | Performed by: STUDENT IN AN ORGANIZED HEALTH CARE EDUCATION/TRAINING PROGRAM

## 2022-11-17 PROCEDURE — 1159F PR MEDICATION LIST DOCUMENTED IN MEDICAL RECORD: ICD-10-PCS | Mod: CPTII,,, | Performed by: STUDENT IN AN ORGANIZED HEALTH CARE EDUCATION/TRAINING PROGRAM

## 2022-11-17 PROCEDURE — 1160F PR REVIEW ALL MEDS BY PRESCRIBER/CLIN PHARMACIST DOCUMENTED: ICD-10-PCS | Mod: CPTII,,, | Performed by: STUDENT IN AN ORGANIZED HEALTH CARE EDUCATION/TRAINING PROGRAM

## 2022-11-17 PROCEDURE — 1159F MED LIST DOCD IN RCRD: CPT | Mod: CPTII,,, | Performed by: STUDENT IN AN ORGANIZED HEALTH CARE EDUCATION/TRAINING PROGRAM

## 2022-11-17 PROCEDURE — 99214 PR OFFICE/OUTPT VISIT, EST, LEVL IV, 30-39 MIN: ICD-10-PCS | Mod: 25,S$PBB,, | Performed by: STUDENT IN AN ORGANIZED HEALTH CARE EDUCATION/TRAINING PROGRAM

## 2022-11-17 PROCEDURE — 3079F DIAST BP 80-89 MM HG: CPT | Mod: CPTII,,, | Performed by: STUDENT IN AN ORGANIZED HEALTH CARE EDUCATION/TRAINING PROGRAM

## 2022-11-17 PROCEDURE — 20526 THER INJECTION CARP TUNNEL: CPT | Mod: 50,PBBFAC | Performed by: STUDENT IN AN ORGANIZED HEALTH CARE EDUCATION/TRAINING PROGRAM

## 2022-11-17 PROCEDURE — 1160F RVW MEDS BY RX/DR IN RCRD: CPT | Mod: CPTII,,, | Performed by: STUDENT IN AN ORGANIZED HEALTH CARE EDUCATION/TRAINING PROGRAM

## 2022-11-17 PROCEDURE — 20526 CARPAL TUNNEL: ICD-10-PCS | Mod: 50,S$PBB,, | Performed by: STUDENT IN AN ORGANIZED HEALTH CARE EDUCATION/TRAINING PROGRAM

## 2022-11-17 PROCEDURE — 99214 OFFICE O/P EST MOD 30 MIN: CPT | Mod: PBBFAC,25 | Performed by: STUDENT IN AN ORGANIZED HEALTH CARE EDUCATION/TRAINING PROGRAM

## 2022-11-17 PROCEDURE — 3008F BODY MASS INDEX DOCD: CPT | Mod: CPTII,,, | Performed by: STUDENT IN AN ORGANIZED HEALTH CARE EDUCATION/TRAINING PROGRAM

## 2022-11-17 RX ORDER — TRIAMCINOLONE ACETONIDE 40 MG/ML
40 INJECTION, SUSPENSION INTRA-ARTICULAR; INTRAMUSCULAR
Status: COMPLETED | OUTPATIENT
Start: 2022-11-17 | End: 2022-11-17

## 2022-11-17 RX ORDER — TRIAMCINOLONE ACETONIDE 40 MG/ML
40 INJECTION, SUSPENSION INTRA-ARTICULAR; INTRAMUSCULAR ONCE
Status: COMPLETED | OUTPATIENT
Start: 2022-11-17 | End: 2022-11-17

## 2022-11-17 RX ADMIN — TRIAMCINOLONE ACETONIDE 40 MG: 40 INJECTION, SUSPENSION INTRA-ARTICULAR; INTRAMUSCULAR at 07:11

## 2022-11-17 NOTE — PROGRESS NOTES
"  Subjective:       Existing pt, last seen March 2022    Patient ID: Dorita Moran is a Right dominate 52 y.o. female. Non-smoker. Employment HX: disabled. Used to be a cook in fast food.         Chief Complaint: Pain and Numbness of the Left Hand and Pain and Numbness of the Right Hand    HPI:    52 Years RHD Female presents to [facility] for follow up visit for bilateral hand numbness and tingling, left worse than right. Pt given CSI at last visit in march to b/l CTS.  Her hands were not bothering her for many months after last steroid injection however over the last month she has had significant increase in hand pain as well as paresthesias both hands.    Onset: insidious over years. States she knows she has a "pinched nerve in her left elbow."  No prior injuries. Patient states she has worsening tingling and pain in her second through fifth fingers bilaterally worse on the left. She also states she has a cold sensation that runs from her elbow into her fingers on her left side.  Previously seen PCP and Dr. Mccormack for left EMG in Feb 2022.  Previous treatment:wrist bracing and gabapentin 800 mg tID ( not helping).  Carpal tunnel CSI last in March 2022, at least 3 months of decreased pain  Current pain level: 9/10 (rated as worsening)   Modifying factors: worse with activity;   Associated Symptoms: numbness/tingling ; no swelling; no skin changes; weakness of ; no decrease in ROM  Activity: sedentary, not full ADLs, pain interfering with ADLs; pain interferes with function/daily activity "moderately"        Note:       Current Outpatient Medications:     ALPRAZolam (XANAX) 1 MG tablet, Take 1 mg by mouth 3 (three) times daily., Disp: , Rfl:     buPROPion (WELLBUTRIN XL) 300 MG 24 hr tablet, Take 300 mg by mouth., Disp: , Rfl:     chlorhexidine (PERIDEX) 0.12 % solution, SMARTSIG:By Mouth, Disp: , Rfl:     cyclobenzaprine (FLEXERIL) 10 MG tablet, Take 10 mg by mouth 3 (three) times daily as " "needed., Disp: , Rfl:     doxepin (SINEQUAN) 150 MG Cap, Take 150 mg by mouth nightly., Disp: , Rfl:     eszopiclone (LUNESTA) 1 MG Tab, Take 3 mg by mouth., Disp: , Rfl:     FLOVENT  mcg/actuation inhaler, Inhale 1 puff into the lungs 2 (two) times daily., Disp: , Rfl:     fluticasone furoate-vilanteroL (BREO) 200-25 mcg/dose DsDv diskus inhaler, Inhale 1 puff into the lungs., Disp: , Rfl:     fluticasone propionate (FLONASE) 50 mcg/actuation nasal spray, 1 spray by Each Nostril route nightly., Disp: , Rfl:     hydroCHLOROthiazide (HYDRODIURIL) 25 MG tablet, Take 25 mg by mouth every morning., Disp: , Rfl:     ipratropium-albuteroL (COMBIVENT)  mcg/actuation inhaler, Inhale 1 puff into the lungs., Disp: , Rfl:     loratadine (CLARITIN) 10 mg tablet, Take 10 mg by mouth nightly., Disp: , Rfl:     lurasidone (LATUDA) 80 mg Tab tablet, Take 80 mg by mouth., Disp: , Rfl:     meclizine (ANTIVERT) 25 MG tablet, Take 32 mg by mouth 3 (three) times daily as needed., Disp: , Rfl:     montelukast (SINGULAIR) 10 mg tablet, SMARTSI Tablet(s) By Mouth Every Evening, Disp: , Rfl:     OXcarbazepine (TRILEPTAL) 300 MG Tab, Take 300 mg by mouth., Disp: , Rfl:     venlafaxine (EFFEXOR-XR) 150 MG Cp24, Take 150 mg by mouth once daily., Disp: , Rfl:     VRAYLAR 3 mg Cap, Take 1 capsule by mouth once daily., Disp: , Rfl:     Current Facility-Administered Medications:     triamcinolone acetonide injection 40 mg, 40 mg, Intra-articular, Once, Sheree Santacruz MD    triamcinolone acetonide injection 40 mg, 40 mg, Intra-articular, 1 time in Clinic/HOD, Sheree Santacruz MD    Review of patient's allergies indicates:   Allergen Reactions    Naproxen Hives    Tramadol Hives and Itching       No results found for: LABA1C  Body mass index is 66.13 kg/m².   Vitals:    22 0730   BP: (!) 148/83   Pulse: 69   Resp: 20   SpO2: 100%   Weight: (!) 158.8 kg (350 lb)   Height: 5' 1" (1.549 m)   PainSc:   9        ROS  Review of " Systems:  A ten-point review of systems was performed and is negative, except as mentioned above.        Objective:      General: well developed; well nourished; cooperative  PSYCH: alert and oriented with appropriate mood and affect  SKIN: inspection and palpation of skin and soft tissue normal;  CV: vascular integrity noted; +2 symmetrical pulses  Resp: Normal work of breathing, quiet breathing    MSK exam Left hand     Inspection: no skin changes; no swelling; no atrophy; no deformity noted.   Palpation: no mass noted;no TTP; no crepitus; no joint effusion   ROM:   Full range of motion of the MCP, PIP and DIP    Neurovascular:   Sensation: two point discrimination intact   Motor:   Radial nerve: IP joint extension against resistance intact   Median nerve:   recurrent motor branch: Palmar abduction of thumb intact   anterior interosseous branch: Ok sign intact   Ulnar nerve: abduction of fingers against resistance intact   Vascular: pulses 2+, Allens test intact, capillary refill 2 seconds   Special tests:   Basilar thumb arthritis: Grind test: negative   De Quervain's tenosynovitis: Finkelstein's test negative, Eichhoff test negative   Dupuytren's: table top test negative   Stability: Newman's test negative, Lunotriquetral ballottement test negative, Fovea sign negative, TFCC compression test negative   UCL ligament test negative   Nerve tests: Tinel wrist positive, Tinel elbow positive, Phalen's positive, Flick negative, Froment's sign negative, Wartenberg negative, Carlie's sign negative    MSK exam Right hand     Inspection: no skin changes; no swelling; no atrophy; no deformity noted.   Palpation: no mass noted;no TTP; no crepitus; no joint effusion   ROM:   full ROM at MCP, PIP and DIP    Neurovascular:   Sensation: two point discrimination diminished bilaterally  Motor:   Radial nerve: IP joint extension against resistance intact   Median nerve:   recurrent motor branch: Palmar abduction of thumb intact    anterior interosseous branch: Ok sign intact   Ulnar nerve: abduction of fingers against resistance intact   Vascular: pulses 2+, Allens test intact, capillary refill 2 seconds   Special tests:   Basilar thumb arthritis: Grind test: negative   De Quervain's tenosynovitis: Finkelstein's test negative, Eichhoff test negative   Dupuytren's: table top test negative   Stability: Newman's test negative, Lunotriquetral ballottement test negative, Fovea sign negative, TFCC compression test negative   UCL ligament test negative   Nerve tests: Tinel wrist positive, Tinel elbow negative, Phalen's positive, Flick negative, Froment's sign negative, Wartenberg negative, Carlie's sign negative    As per review of this patient's left upper extremity EMG from 1/6/2022 the patient was found to have moderate to severe left carpal tunnel syndrome, mild to moderate left ulnar neuropathy at the wrist and elbow as well as sensorimotor polyneuropathy            Assessment:           1. Carpal tunnel syndrome, bilateral    2. Obesity, unspecified classification, unspecified obesity type, unspecified whether serious comorbidity present          Plan:       Orders Placed This Encounter    Carpal Tunnel    triamcinolone acetonide injection 40 mg    triamcinolone acetonide injection 40 mg   52 Years old patient new to sports medicine clinic for evaluation of bilateral upper extremity pain especially in the hands, left worse than right, likely secondary to carpal tunnel syndrome and cubital tunnel syndrome.      Moderate chronic exacerbation  EMG reviewed by myself  CSI performed today to bilateral carpal Tunnel, consented, tolerated well, NVI following injection and improvement in symptoms;  Activity as tolerated, behavior modification encouraged, patient will continue her left wrist splints at night and she was provided with right wrist splint for nighttime use  Encouraged HEP daily, Ice/Heat prn, NSAIDs/Tylenol/topical anasthetics PRN,  patient encouraged to speak with her PCP about continuing or discontinuing gabapentin, med precautions given,     Follow up 4 months, repeat assessment    Sheree Santacruz MD    Carpal Tunnel    Date/Time: 11/17/2022 7:50 AM  Performed by: Sheree Santacruz MD  Authorized by: Sheree Santacruz MD     Consent Done?:  Yes (Written)  Indications:  Pain  Site marked: the procedure site was marked    Timeout: prior to procedure the correct patient, procedure, and site was verified    Prep: patient was prepped and draped in usual sterile fashion      Local anesthesia used?: Yes    Local anesthetic:  Topical anesthetic  Location:  Wrist (L)  Site:  R carpal tunnel and L carpal tunnel  Needle size:  25 G (21G)  Approach:  Volar  Medications:  40 mg (KENALOG-40) injection 40 mg/mL; 40 mg (KENALOG-40) injection 40 mg/mL  Patient tolerance:  Patient tolerated the procedure well with no immediate complications     Additional Comments: Description: The patient was prepped using Chlorhexidine scrub and appropriate identification of anatomic landmarks found by ultrasound. Topical anesthetic of ethyl chloride was used. Sterile needle used (size # 25 gauge, length 1 inch) 40 mg of Kenalog injected into each area.    Complications: None   EBL: None    Post Procedure: Patient reports improvement in pain and ROM. Patient alert, moving all extremities. Good peripheral pulses, no signs of vascular compromise, range of motion intact. Patient tolerated procedure well. Aftercare instructions were given to patient at time of discharge. Relative rest for 3 days - avoiding excessive activity. Place ice on area for 15 minutes every 4 to 6 hours. Tylenol 1000mg twice a day or ibuprofen 600 mg three times per day for next 3-4 days if not on medication already. Protect the area for the next 1-8 hours if anesthetic was used. Avoid excessive activity for next 3 to 4 weeks. ER precautions for fever, severe joint pain, or allergic reaction or other new  symptoms related to joint injection.

## 2023-02-07 ENCOUNTER — HOSPITAL ENCOUNTER (OUTPATIENT)
Dept: RADIOLOGY | Facility: HOSPITAL | Age: 53
Discharge: HOME | End: 2023-02-07
Attending: STUDENT IN AN ORGANIZED HEALTH CARE EDUCATION/TRAINING PROGRAM
Payer: MEDICAID

## 2023-02-07 ENCOUNTER — OFFICE VISIT (OUTPATIENT)
Dept: ORTHOPEDICS | Facility: CLINIC | Age: 53
End: 2023-02-07
Payer: MEDICAID

## 2023-02-07 VITALS
HEART RATE: 87 BPM | HEIGHT: 62 IN | SYSTOLIC BLOOD PRESSURE: 147 MMHG | WEIGHT: 293 LBS | DIASTOLIC BLOOD PRESSURE: 75 MMHG | BODY MASS INDEX: 53.92 KG/M2

## 2023-02-07 DIAGNOSIS — G56.03 CARPAL TUNNEL SYNDROME, BILATERAL: Primary | ICD-10-CM

## 2023-02-07 DIAGNOSIS — E66.01 CLASS 3 SEVERE OBESITY WITH BODY MASS INDEX (BMI) OF 60.0 TO 69.9 IN ADULT, UNSPECIFIED OBESITY TYPE, UNSPECIFIED WHETHER SERIOUS COMORBIDITY PRESENT: ICD-10-CM

## 2023-02-07 DIAGNOSIS — G56.03 CARPAL TUNNEL SYNDROME, BILATERAL: ICD-10-CM

## 2023-02-07 PROBLEM — F32.A DEPRESSION: Status: ACTIVE | Noted: 2023-02-07

## 2023-02-07 PROBLEM — J45.909 ASTHMA: Status: ACTIVE | Noted: 2023-02-07

## 2023-02-07 PROCEDURE — 73130 X-RAY EXAM OF HAND: CPT | Mod: TC,RT

## 2023-02-07 PROCEDURE — 73130 X-RAY EXAM OF HAND: CPT | Mod: TC,LT

## 2023-02-07 PROCEDURE — 99214 OFFICE O/P EST MOD 30 MIN: CPT | Mod: PBBFAC

## 2023-02-07 RX ORDER — DIETHYLPROPION HYDROCHLORIDE 75 MG/1
75 TABLET, EXTENDED RELEASE ORAL EVERY MORNING
COMMUNITY
Start: 2023-01-19

## 2023-02-07 RX ORDER — GABAPENTIN 800 MG/1
800 TABLET ORAL 4 TIMES DAILY
COMMUNITY
Start: 2023-01-15

## 2023-02-07 NOTE — PROGRESS NOTES
Faculty Attestation: Dorita Moran  was seen in Sports Medicine Clinic. Patient seen and evaluated at the time of the visit. History of Present Illness, Physical Exam, and Assessment and Plan reviewed. Treatment plan is reasonable and appropriate. Compliance with treatment recommendations is important.  Radiology images independently reviewed and agree with radiologist interpretation. No procedure was performed.     Sarah Marcos MD  Family/Sports Medicine

## 2023-02-07 NOTE — PROGRESS NOTES
"Subjective:    Patient ID: Dorita Moran is a right handed 52 y.o. female  who presented to [facility] for follow up.      Chief Complaint: Pain of the Right Hand and Pain of the Left Hand      History of Present Illness:    Dorita Moran Is a 52-year-old female who presents today for follow-up of bilateral hand pain, numbness, and tingling.  She has been experiencing this pain for many years.  She was last seen on 11/17/2022.  At that time, she received bilateral CSI to her carpal tunnels.  She additionally had CSI 2 bilateral carpal tunnel 03/29/2022.  She says that these injections lasted for few weeks but her symptoms returned.    Additionally, she has been  taking Aleve, using night splints, and has done physical therapy which have not provided significant relief.  She completed physical therapy 11/2022.  She is interested in discussing surgery today.    Hand Review of Systems:  Swelling?  no  Instability?  no  Clicking?  no  Limited ROM? no  Numbness/Tingling? yes  Weakness? yes       Objective:      Physical Exam:    BP (!) 147/75   Pulse 87   Ht 5' 2" (1.575 m)   Wt (!) 156.2 kg (344 lb 6.4 oz)   BMI 62.99 kg/m²     Appearance:  Soft tissue swelling: Left: no Right: no  Effusion: Left:  Negative Right: Negative  Erythema: Left no Right: no  Ecchymosis: Left: no Right: no  Atrophy: Left: no Right: no    Palpation:  Hand/wrist Tenderness: Left: none  Right: none    Range of motion:  Flexion (0-80): Left:  80 Right: 80  Extension (0-70): Left:  70 Right: 70  Ulnar deviation (0-30): 30 Right: 30  Radial deviation (0-20): 20 Right: 20  Supination (0-90): Left: 90 Right: 90  Pronation (0-90): Left: 90 Right: 90  Able to make a power fist and claw hand: on Both hand(s)  Distal palmar crease-finger tip distance: 0 on Both hand(s)    Strength:  Flexion: Left: 5/5 Pain: no Right: 5/5 Pain: no  Extension: Left: 5/5 Pain: no Right: 5/5 Pain: no  Supination: Left: " 5/5 Pain: no Right: 5/5 Pain: no  Pronation: Left: 5/5 Pain: no Right: 5/5 Pain: no  Ulnar deviation: Left: 5/5 Pain: no Right: 5/5 Pain: no  Radial deviation: Left: 5/5 Pain: no Right: 5/5 Pain: no    Special Tests:  Durkans Test (Carpal Compression test): Left: Positive  Right: Positive  Tinels:  Left: Positive  Right: Positive    Phalens: Left: Positive  Right: Positive    FDP test: Left: Negative  Right: Negative  FDS test: Left: Negative  Right: Negative    AIN/PIN/Radial nerve: Intact and symmetric    General appearance: NAD  Peripheral pulses: normal bilaterally   Sensation: diminished    Labs:  Last A1c: 6.0     Imaging:   Previous images not done.  X-rays ordered and performed today: yes  # of views: 3 Laterality: bilateral  My Interpretation:  Distal Radial ulnar joint space is overall Normal on AP views. Scapholunate interval distance is Normal on bilateral AP views. A DISI/VISI is not suggested on bilateral hand series. Negative/positive ulnar variance is not suggested on lbilateral lateral views.  no fracture, dislocation, swelling or degenerative changes noted      Assessment:      Encounter Diagnoses   Code Name Primary?    G56.03 Carpal tunnel syndrome, bilateral Yes    E66.01, Z68.44 Class 3 severe obesity with body mass index (BMI) of 60.0 to 69.9 in adult, unspecified obesity type, unspecified whether serious comorbidity present         Plan:     Dx: bilateral  carpal tunnel syndrome with failed conservative therapy including NSAIDs, CSI, and PT  Treatment Plan: Discussed with patient diagnosis and treatment recommendations.   Natural history and expected course discussed. Questions answered.  Educational material distributed.  Reduction in offending activity.  Gentle ROM exercises.  Discussed with patient her options of continued conservative therapy or pursuing surgery.  Patient is agreeable surgical intervention as she has had multiple CSI size without significant relief, failed PT, and no  relief from wrist splint.  Will have patient follow up with Orthopedic surgery for carpal tunnel release.  Continue to use  carpal tunnel splints at nighttime.  They can also be used during the day if they provide relief.  Rest, ice, compression, and elevation (RICE) therapy.  Plain film x-rays.  Home physical therapy exercise handouts provided to patient.   Over the counter NSAID and/or tylenol provided you do not have contraindications such as but not limited to liver or kidney disease or uncontrolled blood pressure. If you're doctors have told you to to not take them based on your health, do not take them.   Imaging: radiological studies ordered and independently reviewed; discussed with patient; pending radiologist interpretation.   Procedure: Discussed CSI  As possible treatment option.  However, patient has failed to CSI eyes in the past and does not wish to have further injections at this time.  Activity: Activity as tolerated  Therapy: No formal therapy  Medication: first line treatment with daily acetaminophen. Up to 1000 mg three times daily can be taken; medication precautions given.. Please see your primary care physician for further refills.  RTC: 2 weeks with ortho surgery.

## 2023-02-20 ENCOUNTER — OFFICE VISIT (OUTPATIENT)
Dept: ORTHOPEDICS | Facility: CLINIC | Age: 53
End: 2023-02-20
Payer: MEDICAID

## 2023-02-20 VITALS
OXYGEN SATURATION: 99 % | SYSTOLIC BLOOD PRESSURE: 122 MMHG | BODY MASS INDEX: 53.92 KG/M2 | HEIGHT: 62 IN | WEIGHT: 293 LBS | HEART RATE: 85 BPM | RESPIRATION RATE: 20 BRPM | DIASTOLIC BLOOD PRESSURE: 79 MMHG

## 2023-02-20 DIAGNOSIS — G56.03 CARPAL TUNNEL SYNDROME, BILATERAL: Primary | ICD-10-CM

## 2023-02-20 PROCEDURE — 3078F DIAST BP <80 MM HG: CPT | Mod: CPTII,,, | Performed by: STUDENT IN AN ORGANIZED HEALTH CARE EDUCATION/TRAINING PROGRAM

## 2023-02-20 PROCEDURE — 3074F SYST BP LT 130 MM HG: CPT | Mod: CPTII,,, | Performed by: STUDENT IN AN ORGANIZED HEALTH CARE EDUCATION/TRAINING PROGRAM

## 2023-02-20 PROCEDURE — 3078F PR MOST RECENT DIASTOLIC BLOOD PRESSURE < 80 MM HG: ICD-10-PCS | Mod: CPTII,,, | Performed by: STUDENT IN AN ORGANIZED HEALTH CARE EDUCATION/TRAINING PROGRAM

## 2023-02-20 PROCEDURE — 1159F PR MEDICATION LIST DOCUMENTED IN MEDICAL RECORD: ICD-10-PCS | Mod: CPTII,,, | Performed by: STUDENT IN AN ORGANIZED HEALTH CARE EDUCATION/TRAINING PROGRAM

## 2023-02-20 PROCEDURE — 99213 PR OFFICE/OUTPT VISIT, EST, LEVL III, 20-29 MIN: ICD-10-PCS | Mod: S$PBB,,, | Performed by: STUDENT IN AN ORGANIZED HEALTH CARE EDUCATION/TRAINING PROGRAM

## 2023-02-20 PROCEDURE — 4010F ACE/ARB THERAPY RXD/TAKEN: CPT | Mod: CPTII,,, | Performed by: STUDENT IN AN ORGANIZED HEALTH CARE EDUCATION/TRAINING PROGRAM

## 2023-02-20 PROCEDURE — 1159F MED LIST DOCD IN RCRD: CPT | Mod: CPTII,,, | Performed by: STUDENT IN AN ORGANIZED HEALTH CARE EDUCATION/TRAINING PROGRAM

## 2023-02-20 PROCEDURE — 3008F PR BODY MASS INDEX (BMI) DOCUMENTED: ICD-10-PCS | Mod: CPTII,,, | Performed by: STUDENT IN AN ORGANIZED HEALTH CARE EDUCATION/TRAINING PROGRAM

## 2023-02-20 PROCEDURE — 4010F PR ACE/ARB THEARPY RXD/TAKEN: ICD-10-PCS | Mod: CPTII,,, | Performed by: STUDENT IN AN ORGANIZED HEALTH CARE EDUCATION/TRAINING PROGRAM

## 2023-02-20 PROCEDURE — 3074F PR MOST RECENT SYSTOLIC BLOOD PRESSURE < 130 MM HG: ICD-10-PCS | Mod: CPTII,,, | Performed by: STUDENT IN AN ORGANIZED HEALTH CARE EDUCATION/TRAINING PROGRAM

## 2023-02-20 PROCEDURE — 99215 OFFICE O/P EST HI 40 MIN: CPT | Mod: PBBFAC

## 2023-02-20 PROCEDURE — 3008F BODY MASS INDEX DOCD: CPT | Mod: CPTII,,, | Performed by: STUDENT IN AN ORGANIZED HEALTH CARE EDUCATION/TRAINING PROGRAM

## 2023-02-20 PROCEDURE — 99213 OFFICE O/P EST LOW 20 MIN: CPT | Mod: S$PBB,,, | Performed by: STUDENT IN AN ORGANIZED HEALTH CARE EDUCATION/TRAINING PROGRAM

## 2023-02-20 RX ORDER — LOSARTAN POTASSIUM AND HYDROCHLOROTHIAZIDE 25; 100 MG/1; MG/1
1 TABLET ORAL
COMMUNITY
Start: 2023-02-15

## 2023-02-20 RX ORDER — PREDNISONE 20 MG/1
20 TABLET ORAL
COMMUNITY
Start: 2023-02-10

## 2023-02-20 NOTE — PROGRESS NOTES
[facility] Orthopaedic Surgery H&P Note    In brief, Dorita Moran is a 52 y.o. female with bilateral hand pain and numbness    HPI:  This is a right-hand dominant 52-year-old female who presents today for evaluation of longstanding history bilateral hand numbness and weakness and pain.  She says that over 2 years ago she started having numbness in both of her hands.  Has progressed.  She has done nighttime splinting which no longer alleviate symptoms she has bilateral injections with corticosteroids in her carpal tunnels which no longer provide relief.  She is describes the pain is pins and needles in all of her fingers she has to wake up at night to shake her hands out.  Over the past few months it has started radiating from her neck down to her shoulder and elbow and all the way down to her hand on the right-hand side.  He is taking some over-the-counter anti-inflammatories.  She has a history of back pain.  She is currently on disability.  She says she is a nonsmoker.    PMH:   Past Medical History:   Diagnosis Date    Anxiety     Asthma     Hypertension        PSH:   Past Surgical History:   Procedure Laterality Date    APPENDECTOMY      BARIATRIC SURGERY       SECTION         SH:   Social History     Socioeconomic History    Marital status: Single   Tobacco Use    Smoking status: Never    Smokeless tobacco: Never   Substance and Sexual Activity    Alcohol use: Never    Drug use: Never       FH:   Family History   Family history unknown: Yes       Allergies:   Review of patient's allergies indicates:   Allergen Reactions    Naproxen Hives    Tramadol Hives and Itching       ROS:  Constitutional- no fever, fatigue, weakness  Eye- no vision loss, eye redness, drainage, or pain  ENMT- no sore throat, ear pain, sinus pain/congestion, nasal congestion/drainage  Respiratory- no cough, wheezing, or shortness of breath  CV- no chest pain, no palpitations, no edema  GI- no N/V/D; no abdominal pain    Physical  Exam:  Vitals:    02/20/23 0836   BP: 122/79   Pulse: 85   Resp: 20       General: NAD  Cardio: RRR by peripheral pulse  Pulm: Normal WOB on room air, symmetric chest rise  Abd: Soft, NT/ND    MSK:  Bilateral upper extremity exam     Positive Tinel's at the carpal tunnel   Positive Durkan's carpal tunnel compression test bilateral   Positive Tinel's over the cubital tunnel   Numbness and tingling and small finger with elbow flexion and shoulder abduction   Positive Spurling's right upper extremity  Deltoid 5/5   Bicep 5/5   Triceps 5/5   Wrist flexion/extension 5/5   Interosseous muscles 5/5   Sensation intact to light touch median/ulnar/radial nerve distribution   Negative Darian's exam   Both hands warm and well-perfused       Imaging:   Independent review of radiographs now show any acute osseous injury    Assessment:  52-year-old female with bilateral hand numbness chronic     -we had a long discussion that she likely has some aspect of carpal and cubital tunnel bilaterally.  However, given her history of radiating radicular pain from her neck, I think this warrants that she brings her EMG which she had done at [facility] before discussing surgical intervention   -we will see her back with the EMG results and discuss further at her next visit  -until then, we went over the importance of wearing her splints for her tunnel syndrome  -okay to weightbear as tolerated        Dr. Dank Colby  [facility]

## 2023-02-20 NOTE — PROGRESS NOTES
Faculty Attestation: Dorita Moran  was seen at [facility] in the Orthopaedic Clinic. Patient seen and evaluated at the time of the visit. History of Present Illness, Physical Exam, and Assessment and Plan reviewed. Treatment plan is reasonable and appropriate. Compliance with treatment recommendations is important. No procedure was performed.     Osvaldo Mcgill MD  Orthopaedic Surgery

## 2023-03-01 ENCOUNTER — OFFICE VISIT (OUTPATIENT)
Dept: ORTHOPEDICS | Facility: CLINIC | Age: 53
End: 2023-03-01
Payer: MEDICAID

## 2023-03-01 VITALS
OXYGEN SATURATION: 98 % | WEIGHT: 293 LBS | HEIGHT: 62 IN | HEART RATE: 78 BPM | BODY MASS INDEX: 53.92 KG/M2 | SYSTOLIC BLOOD PRESSURE: 126 MMHG | DIASTOLIC BLOOD PRESSURE: 74 MMHG | RESPIRATION RATE: 21 BRPM

## 2023-03-01 DIAGNOSIS — G56.22 CUBITAL TUNNEL SYNDROME ON LEFT: Primary | ICD-10-CM

## 2023-03-01 DIAGNOSIS — G56.20 CUBITAL TUNNEL SYNDROME: ICD-10-CM

## 2023-03-01 PROCEDURE — 3074F PR MOST RECENT SYSTOLIC BLOOD PRESSURE < 130 MM HG: ICD-10-PCS | Mod: CPTII,,, | Performed by: SPECIALIST

## 2023-03-01 PROCEDURE — 4010F ACE/ARB THERAPY RXD/TAKEN: CPT | Mod: CPTII,,, | Performed by: SPECIALIST

## 2023-03-01 PROCEDURE — 1159F MED LIST DOCD IN RCRD: CPT | Mod: CPTII,,, | Performed by: SPECIALIST

## 2023-03-01 PROCEDURE — 3078F PR MOST RECENT DIASTOLIC BLOOD PRESSURE < 80 MM HG: ICD-10-PCS | Mod: CPTII,,, | Performed by: SPECIALIST

## 2023-03-01 PROCEDURE — 1159F PR MEDICATION LIST DOCUMENTED IN MEDICAL RECORD: ICD-10-PCS | Mod: CPTII,,, | Performed by: SPECIALIST

## 2023-03-01 PROCEDURE — 3074F SYST BP LT 130 MM HG: CPT | Mod: CPTII,,, | Performed by: SPECIALIST

## 2023-03-01 PROCEDURE — 99215 OFFICE O/P EST HI 40 MIN: CPT | Mod: PBBFAC

## 2023-03-01 PROCEDURE — 99214 PR OFFICE/OUTPT VISIT, EST, LEVL IV, 30-39 MIN: ICD-10-PCS | Mod: S$PBB,,, | Performed by: SPECIALIST

## 2023-03-01 PROCEDURE — 3008F BODY MASS INDEX DOCD: CPT | Mod: CPTII,,, | Performed by: SPECIALIST

## 2023-03-01 PROCEDURE — 3008F PR BODY MASS INDEX (BMI) DOCUMENTED: ICD-10-PCS | Mod: CPTII,,, | Performed by: SPECIALIST

## 2023-03-01 PROCEDURE — 99214 OFFICE O/P EST MOD 30 MIN: CPT | Mod: S$PBB,,, | Performed by: SPECIALIST

## 2023-03-01 PROCEDURE — 4010F PR ACE/ARB THEARPY RXD/TAKEN: ICD-10-PCS | Mod: CPTII,,, | Performed by: SPECIALIST

## 2023-03-01 PROCEDURE — 3078F DIAST BP <80 MM HG: CPT | Mod: CPTII,,, | Performed by: SPECIALIST

## 2023-03-01 RX ORDER — MUPIROCIN 20 MG/G
OINTMENT TOPICAL
Status: CANCELLED | OUTPATIENT
Start: 2023-03-01

## 2023-03-01 NOTE — PROGRESS NOTES
Faculty Attestation: Dorita Moran  was seen at [facility] in the Orthopaedic Clinic. Discussed with the resident at the time of the visit. History of Present Illness, Physical Exam, and Assessment and Plan reviewed. Treatment plan is reasonable and appropriate. Compliance with treatment recommendations is important. Discussed with the resident at the time of the visit.  No procedure was performed.     Zak Johnson MD MD  Orthopaedic Surgery

## 2023-03-01 NOTE — PROGRESS NOTES
[facility] Orthopaedic Surgery H&P Note    In brief, Dorita Moran is a 52 y.o. female with bilateral hand pain and numbness    HPI:  This is a right-hand dominant 52-year-old female who presents today for follow-up for evaluation of longstanding history bilateral hand numbness and weakness and pain.  She says that over 2 years ago she started having numbness in both of her hands.  Left hand is more symptomatic than the right.  Has progressed.  She has done nighttime splinting which no longer alleviate symptoms she has bilateral injections with corticosteroids in her carpal tunnels which no longer provide relief.  She is describes the pain is pins and needles in all of her fingers she has to wake up at night to shake her hands out.  Over the past few months it has started radiating from her neck down to her shoulder and elbow and all the way down to her hand on the right-hand side.  He is taking some over-the-counter anti-inflammatories.  She has a history of back pain.  She is currently on disability.  She says she is a nonsmoker.    PMH:   Past Medical History:   Diagnosis Date    Anxiety     Asthma     Hypertension        PSH:   Past Surgical History:   Procedure Laterality Date    APPENDECTOMY      BARIATRIC SURGERY       SECTION         SH:   Social History     Socioeconomic History    Marital status: Single   Tobacco Use    Smoking status: Never    Smokeless tobacco: Never   Substance and Sexual Activity    Alcohol use: Never    Drug use: Never       FH:   Family History   Family history unknown: Yes       Allergies:   Review of patient's allergies indicates:   Allergen Reactions    Naproxen Hives    Tramadol Hives and Itching       ROS:  Constitutional- no fever, fatigue, weakness  Eye- no vision loss, eye redness, drainage, or pain  ENMT- no sore throat, ear pain, sinus pain/congestion, nasal congestion/drainage  Respiratory- no cough, wheezing, or shortness of breath  CV- no chest pain, no palpitations,  no edema  GI- no N/V/D; no abdominal pain    Physical Exam:  Vitals:    03/01/23 1009   BP: 126/74   Pulse: 78   Resp: (!) 21       General: NAD  Cardio: RRR by peripheral pulse  Pulm: Normal WOB on room air, symmetric chest rise  Abd: Soft, NT/ND    MSK:  Bilateral upper extremity exam     Positive Tinel's at the carpal tunnel   Positive Durkan's carpal tunnel compression test bilateral   Positive Tinel's over the cubital tunnel   Numbness and tingling and small finger with elbow flexion and shoulder abduction   Positive Spurling's right upper extremity  Deltoid 5/5   Bicep 5/5   Triceps 5/5   Wrist flexion/extension 5/5   Interosseous muscles 5/5   Sensation intact to light touch median/ulnar/radial nerve distribution   Negative Darian's exam   Both hands warm and well-perfused       Imaging:   Independent review of radiographs now show any acute osseous injury.  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    Assessment:  52-year-old female with bilateral hand numbness chronic     -at this point, her clinical presentation, physical exam, and EMG results are consistent with left carpal and cubital tunnel syndrome.  We discussed the risks, benefits, alternative treatments and indications for open cubital and carpal tunnel release of her left upper extremity.  We discussed the risk of recurrence, infection, neurovascular injury, need for further surgery, chronic pain, loss of limb and life and consented her for left open carpal and cubital tunnel release   -we will move forward with surgery on 4/6/23        Dr. Dank Colby  [facility] Orthopaedic Surgery

## 2023-04-03 ENCOUNTER — PATIENT MESSAGE (OUTPATIENT)
Dept: ADMINISTRATIVE | Facility: OTHER | Age: 53
End: 2023-04-03
Payer: MEDICAID

## 2023-04-04 ENCOUNTER — ANESTHESIA EVENT (OUTPATIENT)
Dept: SURGERY | Facility: HOSPITAL | Age: 53
End: 2023-04-04
Payer: MEDICAID

## 2023-04-04 ENCOUNTER — PATIENT MESSAGE (OUTPATIENT)
Dept: ADMINISTRATIVE | Facility: OTHER | Age: 53
End: 2023-04-04
Payer: MEDICAID

## 2023-04-04 NOTE — ANESTHESIA PREPROCEDURE EVALUATION
04/04/2023  Dorita Moran is a 52 y.o., female with PMHx of super morbid obesity, HTN, asthma, LETTY, GERD, anxiety/depression presents for Lt CTR, CuTR.      Vitals:    04/06/23 0841   BP: (!) 145/75   Pulse: 88   Resp: 20   Temp: 36.3 °C (97.3 °F)     NO BETA BLOCKER USE    Active Ambulatory Problems     Diagnosis Date Noted    Depression 02/07/2023    Asthma 02/07/2023     Resolved Ambulatory Problems     Diagnosis Date Noted    No Resolved Ambulatory Problems     Past Medical History:   Diagnosis Date    Anemia, unspecified     Anxiety     Hypertension     Insomnia     Sleep apnea, unspecified     Unspecified osteoarthritis, unspecified site        Pre-op Assessment    I have reviewed the Patient Summary Reports.     I have reviewed the Nursing Notes. I have reviewed the NPO Status.   I have reviewed the Medications.     Review of Systems  Anesthesia Hx:  No problems with previous Anesthesia  History of prior surgery of interest to airway management or planning: Denies Family Hx of Anesthesia complications.   Denies Personal Hx of Anesthesia complications.   Hematology/Oncology:  Hematology Normal   Oncology Normal     EENT/Dental:EENT/Dental Normal   Cardiovascular:  Cardiovascular Normal     Pulmonary:  Pulmonary Normal    Renal/:  Renal/ Normal     Hepatic/GI:  Hepatic/GI Normal    Musculoskeletal:  Musculoskeletal Normal    Neurological:  Neurology Normal    Endocrine:  Endocrine Normal    Dermatological:  Skin Normal    Psych:  Psychiatric Normal           Physical Exam  General: Alert    Airway:  Mallampati: II / III/ II  Mouth Opening: < 3 cm  TM Distance: Normal  Tongue: Large  Neck ROM: Normal ROM  Neck: Girth Increased      Lab Results   Component Value Date    WBC 5.6 02/09/2021    HGB 9.7 (L) 02/09/2021    HCT 32.0 (L) 02/09/2021    MCV 90.1 02/09/2021     02/09/2021        CMP  Sodium Level   Date Value Ref Range Status   08/19/2019 141 136 - 145 mmol/L Final     Potassium Level   Date Value Ref Range Status   08/19/2019 4.0 3.5 - 5.1 mmol/L Final     Carbon Dioxide   Date Value Ref Range Status   08/19/2019 25.0 21.0 - 32.0 mmol/L Final     Blood Urea Nitrogen   Date Value Ref Range Status   08/19/2019 11.0 7.0 - 18.0 mg/dL Final     Creatinine   Date Value Ref Range Status   08/19/2019 0.82 0.55 - 1.02 mg/dL Final     Calcium Level Total   Date Value Ref Range Status   08/19/2019 10.0 8.5 - 10.1 mg/dL Final     Albumin Level   Date Value Ref Range Status   08/19/2019 3.50 3.40 - 5.00 gm/dL Final     Bilirubin Total   Date Value Ref Range Status   08/19/2019 0.2 0.2 - 1.0 mg/dL Final     Alkaline Phosphatase   Date Value Ref Range Status   08/19/2019 101 38 - 126 unit/L Final     Aspartate Aminotransferase   Date Value Ref Range Status   08/19/2019 13 (L) 15 - 37 unit/L Final     Alanine Aminotransferase   Date Value Ref Range Status   08/19/2019 17 12 - 78 unit/L Final         Anesthesia Plan  Type of Anesthesia, risks & benefits discussed:    Anesthesia Type: MAC, Regional  Intra-op Monitoring Plan: Standard ASA Monitors  Post Op Pain Control Plan: IV/PO Opioids PRN  (medical reason for not using multimodal pain management)  Induction:  IV  Informed Consent: Informed consent signed with the Patient and all parties understand the risks and agree with anesthesia plan.  All questions answered. Patient consented to blood products? No  ASA Score: 3  Day of Surgery Review of History & Physical: H&P Update referred to the surgeon/provider.    Ready For Surgery From Anesthesia Perspective.     .

## 2023-04-05 ENCOUNTER — PATIENT MESSAGE (OUTPATIENT)
Dept: ADMINISTRATIVE | Facility: OTHER | Age: 53
End: 2023-04-05
Payer: MEDICAID

## 2023-04-05 RX ORDER — ONDANSETRON 4 MG/1
4 TABLET, ORALLY DISINTEGRATING ORAL EVERY 8 HOURS PRN
Qty: 21 TABLET | Refills: 0 | Status: SHIPPED | OUTPATIENT
Start: 2023-04-05 | End: 2023-04-12

## 2023-04-05 RX ORDER — OXYCODONE AND ACETAMINOPHEN 5; 325 MG/1; MG/1
1 TABLET ORAL EVERY 6 HOURS PRN
Qty: 20 TABLET | Refills: 0 | Status: SHIPPED | OUTPATIENT
Start: 2023-04-05 | End: 2023-04-10

## 2023-04-05 NOTE — H&P
[facility] Orthopaedic Surgery H&P Note     In brief, Dorita Moran is a 52 y.o. female with bilateral hand pain and numbness     HPI:  Previous: This is a right-hand dominant 52-year-old female who presents today for follow-up for evaluation of longstanding history bilateral hand numbness and weakness and pain.  She says that over 2 years ago she started having numbness in both of her hands.  Left hand is more symptomatic than the right.  Has progressed.  She has done nighttime splinting which no longer alleviate symptoms she has bilateral injections with corticosteroids in her carpal tunnels which no longer provide relief.  She is describes the pain is pins and needles in all of her fingers she has to wake up at night to shake her hands out.  Over the past few months it has started radiating from her neck down to her shoulder and elbow and all the way down to her hand on the right-hand side.  He is taking some over-the-counter anti-inflammatories.  She has a history of back pain.  She is currently on disability.  She says she is a nonsmoker.    Today: pt still with same issues and is here for surgery today. No new changes.     PMH:        Past Medical History:   Diagnosis Date    Anxiety      Asthma      Hypertension           PSH:         Past Surgical History:   Procedure Laterality Date    APPENDECTOMY        BARIATRIC SURGERY         SECTION             SH:   Social History              Socioeconomic History    Marital status: Single   Tobacco Use    Smoking status: Never    Smokeless tobacco: Never   Substance and Sexual Activity    Alcohol use: Never    Drug use: Never            FH:   Family History   Family history unknown: Yes         Allergies:        Review of patient's allergies indicates:   Allergen Reactions    Naproxen Hives    Tramadol Hives and Itching         ROS:  Constitutional- no fever, fatigue, weakness  Eye- no vision loss, eye redness, drainage, or pain  ENMT- no sore throat, ear pain,  sinus pain/congestion, nasal congestion/drainage  Respiratory- no cough, wheezing, or shortness of breath  CV- no chest pain, no palpitations, no edema  GI- no N/V/D; no abdominal pain     Physical Exam:      Vitals:     03/01/23 1009   BP: 126/74   Pulse: 78   Resp: (!) 21         General: NAD  Cardio: RRR by peripheral pulse  Pulm: Normal WOB on room air, symmetric chest rise  Abd: Soft, NT/ND     MSK:  Bilateral upper extremity exam     Positive Tinel's at the carpal tunnel   Positive Durkan's carpal tunnel compression test bilateral   Positive Tinel's over the cubital tunnel   Numbness and tingling and small finger with elbow flexion and shoulder abduction   Positive Spurling's right upper extremity  Deltoid 5/5   Bicep 5/5   Triceps 5/5   Wrist flexion/extension 5/5   Interosseous muscles 5/5   Sensation intact to light touch median/ulnar/radial nerve distribution   Negative Darian's exam   Both hands warm and well-perfused         Imaging:   Independent review of radiographs now show any acute osseous injury.  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     Assessment:  52-year-old female with bilateral hand numbness chronic     -at this point, her clinical presentation, physical exam, and EMG results are consistent with left carpal and cubital tunnel syndrome.  We discussed the risks, benefits, alternative treatments and indications for open cubital and carpal tunnel release of her left upper extremity.  We discussed the risk of recurrence, infection, neurovascular injury, need for further surgery, chronic pain, loss of limb and life and consented her for left open carpal and cubital tunnel release       -we will move forward with surgery Today        Sal Macias

## 2023-04-06 ENCOUNTER — HOSPITAL ENCOUNTER (OUTPATIENT)
Facility: HOSPITAL | Age: 53
Discharge: HOME | End: 2023-04-06
Attending: SURGERY | Admitting: SURGERY
Payer: MEDICAID

## 2023-04-06 ENCOUNTER — ANESTHESIA (OUTPATIENT)
Dept: SURGERY | Facility: HOSPITAL | Age: 53
End: 2023-04-06
Payer: MEDICAID

## 2023-04-06 DIAGNOSIS — G56.22 CUBITAL TUNNEL SYNDROME ON LEFT: ICD-10-CM

## 2023-04-06 DIAGNOSIS — G56.20 CUBITAL TUNNEL SYNDROME: ICD-10-CM

## 2023-04-06 PROCEDURE — 71000015 HC POSTOP RECOV 1ST HR: Performed by: SURGERY

## 2023-04-06 PROCEDURE — 37000009 HC ANESTHESIA EA ADD 15 MINS: Performed by: SURGERY

## 2023-04-06 PROCEDURE — 37000008 HC ANESTHESIA 1ST 15 MINUTES: Performed by: SURGERY

## 2023-04-06 PROCEDURE — D9220A PRA ANESTHESIA: ICD-10-PCS | Mod: ANES,,, | Performed by: SPECIALIST

## 2023-04-06 PROCEDURE — D9220A PRA ANESTHESIA: Mod: CRNA,,, | Performed by: NURSE ANESTHETIST, CERTIFIED REGISTERED

## 2023-04-06 PROCEDURE — 01810 ANES PX NRV MUSC F/ARM WRST: CPT | Performed by: SURGERY

## 2023-04-06 PROCEDURE — 36000707: Performed by: SURGERY

## 2023-04-06 PROCEDURE — 63600175 PHARM REV CODE 636 W HCPCS: Performed by: NURSE ANESTHETIST, CERTIFIED REGISTERED

## 2023-04-06 PROCEDURE — 25000003 PHARM REV CODE 250: Performed by: NURSE ANESTHETIST, CERTIFIED REGISTERED

## 2023-04-06 PROCEDURE — 64718 PR REVISE ULNAR NERVE AT ELBOW: ICD-10-PCS | Mod: LT,,, | Performed by: SURGERY

## 2023-04-06 PROCEDURE — 64718 REVISE ULNAR NERVE AT ELBOW: CPT | Mod: LT,,, | Performed by: SURGERY

## 2023-04-06 PROCEDURE — 36000706: Performed by: SURGERY

## 2023-04-06 PROCEDURE — 63600175 PHARM REV CODE 636 W HCPCS

## 2023-04-06 PROCEDURE — 63600175 PHARM REV CODE 636 W HCPCS: Performed by: ORTHOPAEDIC SURGERY

## 2023-04-06 PROCEDURE — 64721 PR REVISE MEDIAN N/CARPAL TUNNEL SURG: ICD-10-PCS | Mod: 51,LT,, | Performed by: SURGERY

## 2023-04-06 PROCEDURE — 64721 CARPAL TUNNEL SURGERY: CPT | Mod: 51,LT,, | Performed by: SURGERY

## 2023-04-06 PROCEDURE — D9220A PRA ANESTHESIA: Mod: ANES,,, | Performed by: SPECIALIST

## 2023-04-06 PROCEDURE — 63600175 PHARM REV CODE 636 W HCPCS: Performed by: ANESTHESIOLOGY

## 2023-04-06 PROCEDURE — D9220A PRA ANESTHESIA: ICD-10-PCS | Mod: CRNA,,, | Performed by: NURSE ANESTHETIST, CERTIFIED REGISTERED

## 2023-04-06 RX ORDER — BUPIVACAINE HYDROCHLORIDE 2.5 MG/ML
INJECTION, SOLUTION EPIDURAL; INFILTRATION; INTRACAUDAL
Status: DISCONTINUED
Start: 2023-04-06 | End: 2023-04-06 | Stop reason: WASHOUT

## 2023-04-06 RX ORDER — FENTANYL CITRATE 50 UG/ML
INJECTION, SOLUTION INTRAMUSCULAR; INTRAVENOUS
Status: DISCONTINUED | OUTPATIENT
Start: 2023-04-06 | End: 2023-04-06

## 2023-04-06 RX ORDER — KETAMINE HYDROCHLORIDE 50 MG/ML
INJECTION, SOLUTION INTRAMUSCULAR; INTRAVENOUS
Status: DISCONTINUED | OUTPATIENT
Start: 2023-04-06 | End: 2023-04-06

## 2023-04-06 RX ORDER — SODIUM CHLORIDE, SODIUM LACTATE, POTASSIUM CHLORIDE, CALCIUM CHLORIDE 600; 310; 30; 20 MG/100ML; MG/100ML; MG/100ML; MG/100ML
INJECTION, SOLUTION INTRAVENOUS CONTINUOUS
Status: ACTIVE | OUTPATIENT
Start: 2023-04-06

## 2023-04-06 RX ORDER — DEXMEDETOMIDINE HYDROCHLORIDE 100 UG/ML
INJECTION, SOLUTION INTRAVENOUS
Status: DISCONTINUED | OUTPATIENT
Start: 2023-04-06 | End: 2023-04-06

## 2023-04-06 RX ORDER — LIDOCAINE HCL/EPINEPHRINE/PF 2%-1:200K
VIAL (ML) INJECTION
Status: DISCONTINUED
Start: 2023-04-06 | End: 2023-04-06 | Stop reason: WASHOUT

## 2023-04-06 RX ORDER — MIDAZOLAM HYDROCHLORIDE 1 MG/ML
5 INJECTION INTRAMUSCULAR; INTRAVENOUS ONCE AS NEEDED
Status: COMPLETED | OUTPATIENT
Start: 2023-04-06 | End: 2023-04-06

## 2023-04-06 RX ORDER — ONDANSETRON 2 MG/ML
INJECTION INTRAMUSCULAR; INTRAVENOUS
Status: DISCONTINUED | OUTPATIENT
Start: 2023-04-06 | End: 2023-04-06

## 2023-04-06 RX ORDER — PROPOFOL 10 MG/ML
VIAL (ML) INTRAVENOUS CONTINUOUS PRN
Status: DISCONTINUED | OUTPATIENT
Start: 2023-04-06 | End: 2023-04-06

## 2023-04-06 RX ORDER — LIDOCAINE HYDROCHLORIDE 20 MG/ML
INJECTION INTRAVENOUS
Status: DISCONTINUED | OUTPATIENT
Start: 2023-04-06 | End: 2023-04-06

## 2023-04-06 RX ORDER — DEXAMETHASONE SODIUM PHOSPHATE 4 MG/ML
INJECTION, SOLUTION INTRA-ARTICULAR; INTRALESIONAL; INTRAMUSCULAR; INTRAVENOUS; SOFT TISSUE
Status: DISCONTINUED | OUTPATIENT
Start: 2023-04-06 | End: 2023-04-06

## 2023-04-06 RX ORDER — MIDAZOLAM HYDROCHLORIDE 1 MG/ML
INJECTION INTRAMUSCULAR; INTRAVENOUS
Status: COMPLETED
Start: 2023-04-06 | End: 2023-04-06

## 2023-04-06 RX ORDER — MUPIROCIN 20 MG/G
OINTMENT TOPICAL
Status: DISCONTINUED | OUTPATIENT
Start: 2023-04-06 | End: 2023-04-06 | Stop reason: HOSPADM

## 2023-04-06 RX ORDER — CEFAZOLIN SODIUM 1 G/3ML
2 INJECTION, POWDER, FOR SOLUTION INTRAMUSCULAR; INTRAVENOUS
Status: COMPLETED | OUTPATIENT
Start: 2023-04-06 | End: 2023-04-06

## 2023-04-06 RX ADMIN — DEXMEDETOMIDINE 10 MCG: 200 INJECTION, SOLUTION INTRAVENOUS at 09:04

## 2023-04-06 RX ADMIN — SODIUM CHLORIDE, POTASSIUM CHLORIDE, SODIUM LACTATE AND CALCIUM CHLORIDE: 600; 310; 30; 20 INJECTION, SOLUTION INTRAVENOUS at 09:04

## 2023-04-06 RX ADMIN — KETAMINE HYDROCHLORIDE 25 MG: 50 INJECTION INTRAMUSCULAR; INTRAVENOUS at 09:04

## 2023-04-06 RX ADMIN — LIDOCAINE HYDROCHLORIDE 100 MG: 20 INJECTION, SOLUTION INTRAVENOUS at 09:04

## 2023-04-06 RX ADMIN — MIDAZOLAM HYDROCHLORIDE 4 MG: 1 INJECTION, SOLUTION INTRAMUSCULAR; INTRAVENOUS at 09:04

## 2023-04-06 RX ADMIN — FENTANYL CITRATE 50 MCG: 50 INJECTION, SOLUTION INTRAMUSCULAR; INTRAVENOUS at 09:04

## 2023-04-06 RX ADMIN — CEFAZOLIN 3 G: 330 INJECTION, POWDER, FOR SOLUTION INTRAMUSCULAR; INTRAVENOUS at 09:04

## 2023-04-06 RX ADMIN — FENTANYL CITRATE 25 MCG: 50 INJECTION, SOLUTION INTRAMUSCULAR; INTRAVENOUS at 09:04

## 2023-04-06 RX ADMIN — SODIUM CHLORIDE, POTASSIUM CHLORIDE, SODIUM LACTATE AND CALCIUM CHLORIDE: 600; 310; 30; 20 INJECTION, SOLUTION INTRAVENOUS at 08:04

## 2023-04-06 RX ADMIN — ONDANSETRON 4 MG: 2 INJECTION INTRAMUSCULAR; INTRAVENOUS at 09:04

## 2023-04-06 RX ADMIN — PROPOFOL 80 MCG/KG/MIN: 10 INJECTION, EMULSION INTRAVENOUS at 09:04

## 2023-04-06 RX ADMIN — DEXAMETHASONE SODIUM PHOSPHATE 8 MG: 4 INJECTION, SOLUTION INTRA-ARTICULAR; INTRALESIONAL; INTRAMUSCULAR; INTRAVENOUS; SOFT TISSUE at 09:04

## 2023-04-06 RX ADMIN — MIDAZOLAM HYDROCHLORIDE 4 MG: 1 INJECTION INTRAMUSCULAR; INTRAVENOUS at 09:04

## 2023-04-06 NOTE — ANESTHESIA POSTPROCEDURE EVALUATION
Anesthesia Post Evaluation    Patient: Dorita Moran    Procedure(s) Performed: Procedure(s) (LRB):  RELEASE, CARPAL TUNNEL (Left)  RELEASE, CUBITAL TUNNEL (Left)    Final Anesthesia Type: MAC      Patient location during evaluation: OPS  Patient participation: Yes- Able to Participate  Level of consciousness: awake and alert  Post-procedure vital signs: reviewed and stable  Pain management: adequate  Airway patency: patent    PONV status at discharge: No PONV  Anesthetic complications: no      Cardiovascular status: hemodynamically stable  Respiratory status: spontaneous ventilation  Hydration status: euvolemic  Follow-up not needed.          Vitals Value Taken Time   /75 04/06/23 0841   Temp 36.3 °C (97.3 °F) 04/06/23 0841   Pulse 88 04/06/23 0841   Resp 20 04/06/23 0841   SpO2 100 % 04/06/23 0841         No case tracking events are documented in the log.      Pain/Radha Score: No data recorded

## 2023-04-06 NOTE — TRANSFER OF CARE
"Anesthesia Transfer of Care Note    Patient: Dorita Moran    Procedure(s) Performed: Procedure(s) (LRB):  RELEASE, CARPAL TUNNEL (Left)  RELEASE, CUBITAL TUNNEL (Left)    Patient location: PACU    Anesthesia Type: MAC    Transport from OR: Transported from OR on room air with adequate spontaneous ventilation    Post assessment: no apparent anesthetic complications    Post vital signs: stable    Level of consciousness: awake    Nausea/Vomiting: no nausea/vomiting    Complications: none    Transfer of care protocol was followed      Last vitals:   Visit Vitals  BP (!) 145/75   Pulse 88   Temp 36.3 °C (97.3 °F) (Temporal)   Resp 20   Ht 5' 2" (1.575 m)   Wt (!) 154.2 kg (340 lb)   LMP  (Within Years)   SpO2 100%   Breastfeeding No   BMI 62.19 kg/m²     "

## 2023-04-06 NOTE — OP NOTE
[facility]                      [address] [place] [address]    PATIENT NAME:      MISBAH CARLOS  YOB: 1970  CSN:               [id number]  MRN:               [id number]  ADMIT DATE:        04/06/2023 07:28:00  PHYSICIAN:         Jerry Rodriguez MD                          OPERATIVE REPORT      DATE OF SURGERY:    04/06/2023 00:00:00    SURGEON:  Jerry Rodriguez MD    PREOPERATIVE DIAGNOSIS:  Carpal tunnel syndrome, cubital tunnel syndrome, left   arm.    POSTOPERATIVE DIAGNOSIS:  Carpal tunnel syndrome, cubital tunnel syndrome, left   arm.    PROCEDURES:    1. Open carpal tunnel release, left hand.  2. Cubital tunnel release of the left elbow.    INDICATIONS FOR PROCEDURE:  Misbah Carlos is a 52-year-old female who presents   with longstanding history of carpal and cubital tunnel syndrome of left hand.    She presents for operative release.    ANESTHESIA:  Local, MAC.    COMPLICATIONS:  None.    PROCEDURE IN DETAIL:  The patient was placed under an axillary block and given   sedation and prepped and draped in usual sterile fashion.  An incision was made   in the volar aspect of left hand.  Dissection was carried out to the transverse   carpal ligament.  Under direct visualization, the transverse carpal ligament was   released sharply being careful not to injure the median nerve for an open   carpal tunnel release.  I turned our attention to the left elbow.  Using the   medial epicondyle as a guideline, a 10 cm incision was made using a 15-blade   scalpel, and the soft tissue was divided down to the level of the fascia   overlying the cubital tunnel at the level of the medial epicondyle of left   elbow.  The fascia was then released.  The ligament of [place] and ligament of   [place] were released under direct visualization.  The entirety of the ulnar   nerve was released to prevent additional  compression.  Deep tissue was closed   using 3-0 Vicryl.  Permanent suture was used to close the skin.  There were no   complications.  I was present for key portion of the procedure.  The tourniquet   was released.  The fingers were pink at the end of the case.        ______________________________  MD TAMAR Campos/SHANE  DD:  04/06/2023  Time:  09:51AM  DT:  04/06/2023  Time:  10:12AM  Job #:  [id number]      OPERATIVE REPORT

## 2023-04-06 NOTE — DISCHARGE INSTRUCTIONS
No lifting with left hand > 5 lbs until return appointment  Keep elbow dressing on until post op visit  Hand dressing may come off in 5 days then just cover with dry gauze  Keep hand elevated and ice on it 6-8 x/day  Pain medicine as prescribed no driving while on pain meds

## 2023-04-11 ENCOUNTER — PATIENT MESSAGE (OUTPATIENT)
Dept: RESEARCH | Facility: HOSPITAL | Age: 53
End: 2023-04-11
Payer: MEDICAID

## 2023-04-12 VITALS
SYSTOLIC BLOOD PRESSURE: 134 MMHG | HEART RATE: 89 BPM | WEIGHT: 293 LBS | BODY MASS INDEX: 53.92 KG/M2 | TEMPERATURE: 98 F | DIASTOLIC BLOOD PRESSURE: 88 MMHG | HEIGHT: 62 IN | OXYGEN SATURATION: 100 % | RESPIRATION RATE: 18 BRPM

## 2023-04-17 ENCOUNTER — OFFICE VISIT (OUTPATIENT)
Dept: ORTHOPEDICS | Facility: CLINIC | Age: 53
End: 2023-04-17
Payer: MEDICAID

## 2023-04-17 VITALS — HEIGHT: 62 IN | WEIGHT: 293 LBS | BODY MASS INDEX: 53.92 KG/M2

## 2023-04-17 DIAGNOSIS — G56.22 CUBITAL TUNNEL SYNDROME ON LEFT: Primary | ICD-10-CM

## 2023-04-17 PROCEDURE — 1159F MED LIST DOCD IN RCRD: CPT | Mod: CPTII,,, | Performed by: SPECIALIST

## 2023-04-17 PROCEDURE — 99214 OFFICE O/P EST MOD 30 MIN: CPT | Mod: PBBFAC

## 2023-04-17 PROCEDURE — 99024 PR POST-OP FOLLOW-UP VISIT: ICD-10-PCS | Mod: ,,, | Performed by: SPECIALIST

## 2023-04-17 PROCEDURE — 3008F BODY MASS INDEX DOCD: CPT | Mod: CPTII,,, | Performed by: SPECIALIST

## 2023-04-17 PROCEDURE — 99024 POSTOP FOLLOW-UP VISIT: CPT | Mod: ,,, | Performed by: SPECIALIST

## 2023-04-17 PROCEDURE — 4010F ACE/ARB THERAPY RXD/TAKEN: CPT | Mod: CPTII,,, | Performed by: SPECIALIST

## 2023-04-17 PROCEDURE — 3008F PR BODY MASS INDEX (BMI) DOCUMENTED: ICD-10-PCS | Mod: CPTII,,, | Performed by: SPECIALIST

## 2023-04-17 PROCEDURE — 1159F PR MEDICATION LIST DOCUMENTED IN MEDICAL RECORD: ICD-10-PCS | Mod: CPTII,,, | Performed by: SPECIALIST

## 2023-04-17 PROCEDURE — 4010F PR ACE/ARB THEARPY RXD/TAKEN: ICD-10-PCS | Mod: CPTII,,, | Performed by: SPECIALIST

## 2023-04-17 NOTE — PROGRESS NOTES
[facility] Orthopaedic Surgery H&P Note    In brief, Dorita Moran is a 53 y.o. female with bilateral hand pain and numbness status post left carpal and cubital tunnel release 2023    HPI:  Patient returns for 1st postoperative visit now 2 weeks out.  Wounds have healed well without complication or signs of infection.  No systemic signs of infection.  Patient reports substantial improvement in numbness of the hand says her function has improved and is able to hold her phone without issues.  Has not been waking her up at night.  He is overall pleased with this result.  Has been working on range of motion exercises.  No other issues.  Wants the other side addressed surgically soon as possible.    PMH:   Past Medical History:   Diagnosis Date    Anemia, unspecified     Anxiety     Asthma     Hypertension     Insomnia     Sleep apnea, unspecified     Unspecified osteoarthritis, unspecified site        PSH:   Past Surgical History:   Procedure Laterality Date    APPENDECTOMY      BARIATRIC SURGERY      x2    CARPAL TUNNEL RELEASE Left 2023    Procedure: RELEASE, CARPAL TUNNEL;  Surgeon: Jerry Rodriguez MD;  Location: [facility];  Service: Plastics;  Laterality: Left;     SECTION      INTRAUTERINE DEVICE INSERTION      ULNAR TUNNEL RELEASE Left 2023    Procedure: RELEASE, CUBITAL TUNNEL;  Surgeon: Jerry Rodriguez MD;  Location: [facility];  Service: Plastics;  Laterality: Left;       SH:   Social History     Socioeconomic History    Marital status: Single   Tobacco Use    Smoking status: Never    Smokeless tobacco: Never   Substance and Sexual Activity    Alcohol use: Never    Drug use: Never       FH:   Family History   Family history unknown: Yes       Allergies:   Review of patient's allergies indicates:   Allergen Reactions    Naproxen Hives    Tramadol Hives and Itching       ROS:  Constitutional- no fever, fatigue, weakness  Eye- no vision loss, eye redness, drainage, or pain  ENMT- no sore throat,  ear pain, sinus pain/congestion, nasal congestion/drainage  Respiratory- no cough, wheezing, or shortness of breath  CV- no chest pain, no palpitations, no edema  GI- no N/V/D; no abdominal pain    Physical Exam:  There were no vitals filed for this visit.      General: NAD  Cardio: RRR by peripheral pulse  Pulm: Normal WOB on room air, symmetric chest rise  Abd: Soft, NT/ND    MSK:  Left upper extremity    Incisions well approximated with nylon suture no erythema or drainage  Full painless range of motion elbow and wrist  Sensation intact to light touch median/ulnar/radial nerve distribution   Motor intact ain/pin/U   Both hands warm and well-perfused       Imaging:   No new    Assessment:  52-year-old female with bilateral hand numbness chronic status post left carpal cubital tunnel release 04/06/2023     Patient doing very well at 2 weeks postop.  We will get sutures out today dressed with Steri-Strips.  No restrictions.  She can return to clinic 2-3 months as needed to discuss surgery on the right side.    Itz Dalal MD  [facility] Orthopaedic Surgery

## 2023-04-17 NOTE — PROGRESS NOTES
Faculty Attestation: Dorita Moran  was seen at [facility] in the Orthopaedic Clinic. Patient chart reviewed. History of Present Illness, Physical Exam, and Assessment and Plan reviewed. Treatment plan is reasonable and appropriate. Compliance with treatment recommendations is important. No procedure was performed.     Kevin Reagan MD  Orthopaedic Surgery

## 2023-05-10 NOTE — DISCHARGE SUMMARY
[facility]  Discharge Note  Short Stay    Procedure(s) (LRB):  RELEASE, CARPAL TUNNEL (Left)  RELEASE, CUBITAL TUNNEL (Left)      OUTCOME: Patient tolerated treatment/procedure well without complication and is now ready for discharge.    DISPOSITION: Home or Self Care    FINAL DIAGNOSIS:  <principal problem not specified>    FOLLOWUP: In clinic    DISCHARGE INSTRUCTIONS:    Discharge Procedure Orders   Weight bearing restrictions (specify):   Order Comments: <5lb         Clinical Reference Documents Added to Patient Instructions         Document    CARPAL TUNNEL RELEASE (ENGLISH)            TIME SPENT ON DISCHARGE:    minutes

## 2023-05-10 NOTE — H&P
[facility] Orthopaedic Surgery H&P Note     In brief, Dorita Moran is a 52 y.o. female with bilateral hand pain and numbness     HPI:  This is a right-hand dominant 52-year-old female who presents today for follow-up for evaluation of longstanding history bilateral hand numbness and weakness and pain.  She says that over 2 years ago she started having numbness in both of her hands.  Left hand is more symptomatic than the right.  Has progressed.  She has done nighttime splinting which no longer alleviate symptoms she has bilateral injections with corticosteroids in her carpal tunnels which no longer provide relief.  She is describes the pain is pins and needles in all of her fingers she has to wake up at night to shake her hands out.  Over the past few months it has started radiating from her neck down to her shoulder and elbow and all the way down to her hand on the right-hand side.  He is taking some over-the-counter anti-inflammatories.  She has a history of back pain.  She is currently on disability.  She says she is a nonsmoker.     PMH:        Past Medical History:   Diagnosis Date    Anxiety      Asthma      Hypertension           PSH:         Past Surgical History:   Procedure Laterality Date    APPENDECTOMY        BARIATRIC SURGERY         SECTION             SH:   Social History              Socioeconomic History    Marital status: Single   Tobacco Use    Smoking status: Never    Smokeless tobacco: Never   Substance and Sexual Activity    Alcohol use: Never    Drug use: Never            FH:   Family History   Family history unknown: Yes         Allergies:        Review of patient's allergies indicates:   Allergen Reactions    Naproxen Hives    Tramadol Hives and Itching         ROS:  Constitutional- no fever, fatigue, weakness  Eye- no vision loss, eye redness, drainage, or pain  ENMT- no sore throat, ear pain, sinus pain/congestion, nasal congestion/drainage  Respiratory- no cough, wheezing, or  shortness of breath  CV- no chest pain, no palpitations, no edema  GI- no N/V/D; no abdominal pain     Physical Exam:      Vitals:     03/01/23 1009   BP: 126/74   Pulse: 78   Resp: (!) 21         General: NAD  Cardio: RRR by peripheral pulse  Pulm: Normal WOB on room air, symmetric chest rise  Abd: Soft, NT/ND     MSK:  Bilateral upper extremity exam     Positive Tinel's at the carpal tunnel   Positive Durkan's carpal tunnel compression test bilateral   Positive Tinel's over the cubital tunnel   Numbness and tingling and small finger with elbow flexion and shoulder abduction   Positive Spurling's right upper extremity  Deltoid 5/5   Bicep 5/5   Triceps 5/5   Wrist flexion/extension 5/5   Interosseous muscles 5/5   Sensation intact to light touch median/ulnar/radial nerve distribution   Negative Darian's exam   Both hands warm and well-perfused         Imaging:   Independent review of radiographs now show any acute osseous injury.  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     Assessment:  52-year-old female with bilateral hand numbness chronic     -at this point, her clinical presentation, physical exam, and EMG results are consistent with left carpal and cubital tunnel syndrome.  We discussed the risks, benefits, alternative treatments and indications for open cubital and carpal tunnel release of her left upper extremity.  We discussed the risk of recurrence, infection, neurovascular injury, need for further surgery, chronic pain, loss of limb and life and consented her for left open carpal and cubital tunnel release   -we will move forward with surgery on 4/6/23           Dr. Dank Colby  [facility] Orthopaedic Surgery

## 2023-07-17 ENCOUNTER — OFFICE VISIT (OUTPATIENT)
Dept: ORTHOPEDICS | Facility: CLINIC | Age: 53
End: 2023-07-17
Payer: MEDICAID

## 2023-07-17 VITALS — BODY MASS INDEX: 53.92 KG/M2 | WEIGHT: 293 LBS | HEIGHT: 62 IN

## 2023-07-17 DIAGNOSIS — G56.03 CARPAL TUNNEL SYNDROME, BILATERAL: Primary | ICD-10-CM

## 2023-07-17 PROCEDURE — 99499 UNLISTED E&M SERVICE: CPT | Mod: S$PBB,,, | Performed by: ORTHOPAEDIC SURGERY

## 2023-07-17 PROCEDURE — 3008F BODY MASS INDEX DOCD: CPT | Mod: CPTII,,, | Performed by: ORTHOPAEDIC SURGERY

## 2023-07-17 PROCEDURE — 99214 OFFICE O/P EST MOD 30 MIN: CPT | Mod: PBBFAC

## 2023-07-17 PROCEDURE — 4010F PR ACE/ARB THEARPY RXD/TAKEN: ICD-10-PCS | Mod: CPTII,,, | Performed by: ORTHOPAEDIC SURGERY

## 2023-07-17 PROCEDURE — 99499 NO LOS: ICD-10-PCS | Mod: S$PBB,,, | Performed by: ORTHOPAEDIC SURGERY

## 2023-07-17 PROCEDURE — 1159F MED LIST DOCD IN RCRD: CPT | Mod: CPTII,,, | Performed by: ORTHOPAEDIC SURGERY

## 2023-07-17 PROCEDURE — 3008F PR BODY MASS INDEX (BMI) DOCUMENTED: ICD-10-PCS | Mod: CPTII,,, | Performed by: ORTHOPAEDIC SURGERY

## 2023-07-17 PROCEDURE — 4010F ACE/ARB THERAPY RXD/TAKEN: CPT | Mod: CPTII,,, | Performed by: ORTHOPAEDIC SURGERY

## 2023-07-17 PROCEDURE — 1159F PR MEDICATION LIST DOCUMENTED IN MEDICAL RECORD: ICD-10-PCS | Mod: CPTII,,, | Performed by: ORTHOPAEDIC SURGERY

## 2023-07-17 NOTE — PROGRESS NOTES
[facility] Orthopaedic Surgery H&P Note    In brief, Dorita Moran is a 53 y.o. female with bilateral hand pain and numbness status post left carpal and cubital tunnel release 2023    HPI:  Patient returns today for follow-up.  Overall she is doing well.  Her left-sided symptoms are completely resolved.  She is not having symptoms in the right side.  States that she has night pain that wakes her from sleep.  Localizes the pain over the index and long finger.  She states that her hands get weak and numb.  She is having issue perform activities of daily living.  She wore braces in the past but they stopped helping.  Occasional neck pain radiation down the shoulder.  No new trauma or injury.    PMH:   Past Medical History:   Diagnosis Date    Anemia, unspecified     Anxiety     Asthma     Hypertension     Insomnia     Sleep apnea, unspecified     Unspecified osteoarthritis, unspecified site        PSH:   Past Surgical History:   Procedure Laterality Date    APPENDECTOMY      BARIATRIC SURGERY      x2    CARPAL TUNNEL RELEASE Left 2023    Procedure: RELEASE, CARPAL TUNNEL;  Surgeon: Jerry Rodriguez MD;  Location: [facility];  Service: Plastics;  Laterality: Left;     SECTION      INTRAUTERINE DEVICE INSERTION      ULNAR TUNNEL RELEASE Left 2023    Procedure: RELEASE, CUBITAL TUNNEL;  Surgeon: Jerry Rodriguez MD;  Location: [facility] OR;  Service: Plastics;  Laterality: Left;       SH:   Social History     Socioeconomic History    Marital status: Single   Tobacco Use    Smoking status: Never    Smokeless tobacco: Never   Substance and Sexual Activity    Alcohol use: Never    Drug use: Never       FH:   Family History   Family history unknown: Yes       Allergies:   Review of patient's allergies indicates:   Allergen Reactions    Naproxen Hives    Tramadol Hives and Itching       ROS:  Constitutional- no fever, fatigue, weakness  Eye- no vision loss, eye redness, drainage, or pain  ENMT- no sore throat,  ear pain, sinus pain/congestion, nasal congestion/drainage  Respiratory- no cough, wheezing, or shortness of breath  CV- no chest pain, no palpitations, no edema  GI- no N/V/D; no abdominal pain    Physical Exam:  There were no vitals filed for this visit.      General: NAD  Cardio: RRR by peripheral pulse  Pulm: Normal WOB on room air, symmetric chest rise  Abd: Soft, NT/ND    MSK:  Left upper extremity    Incisions well approximated with nylon suture no erythema or drainage  Full painless range of motion elbow and wrist  Sensation intact to light touch median/ulnar/radial nerve distribution   Motor intact ain/pin/U   Both hands warm and well-perfused     Right upper extremity:  No open wounds, abrasions ulcerations   Positive Tinel at the wrist and elbow   Positive Phalen's and Durkan's   No pain with elbow flexion   Diminished 2 point discrimination radial 3 fingers  Motor intact median/ulnar/radial/anterior/posterior interosseous nerve distribution  Hand warm or perfused      Imaging:   No new    Assessment:  53-year-old female with bilateral hand numbness chronic status post left carpal cubital tunnel release 04/06/2023 now with right-sided symptoms  Patient's prior EMG only demonstrated results for the left side.  -we will obtain EMG of the right upper extremity  -follow-up after EMG to discuss surgery    Berny Guerra MD  [facility] Orthopaedic Surgery

## 2023-07-17 NOTE — PROGRESS NOTES
Faculty Attestation: Dorita Moran  was seen at [facility] in the Orthopaedic Clinic. Discussed with the resident at the time of the visit. History of Present Illness, Physical Exam, and Assessment and Plan reviewed. Treatment plan is reasonable and appropriate. Compliance with treatment recommendations is important. No procedure was performed.     Cipriano Romeo MD  Orthopaedic Surgery

## 2023-11-06 DIAGNOSIS — D36.9 INTRADUCTAL PAPILLOMA: Primary | ICD-10-CM

## 2023-11-30 ENCOUNTER — OFFICE VISIT (OUTPATIENT)
Dept: SURGERY | Facility: CLINIC | Age: 53
End: 2023-11-30
Payer: MEDICAID

## 2023-11-30 VITALS
OXYGEN SATURATION: 98 % | WEIGHT: 293 LBS | RESPIRATION RATE: 18 BRPM | HEIGHT: 62 IN | DIASTOLIC BLOOD PRESSURE: 92 MMHG | HEART RATE: 65 BPM | SYSTOLIC BLOOD PRESSURE: 170 MMHG | BODY MASS INDEX: 53.92 KG/M2

## 2023-11-30 DIAGNOSIS — D36.9 INTRADUCTAL PAPILLOMA: ICD-10-CM

## 2023-11-30 PROCEDURE — 99215 OFFICE O/P EST HI 40 MIN: CPT | Mod: PBBFAC

## 2023-11-30 RX ORDER — HEPARIN SODIUM 5000 [USP'U]/ML
5000 INJECTION, SOLUTION INTRAVENOUS; SUBCUTANEOUS ONCE
Status: CANCELLED | OUTPATIENT
Start: 2023-11-30

## 2023-11-30 RX ORDER — CEFAZOLIN SODIUM 1 G/3ML
2 INJECTION, POWDER, FOR SOLUTION INTRAMUSCULAR; INTRAVENOUS ONCE
Status: CANCELLED | OUTPATIENT
Start: 2023-11-30

## 2023-11-30 NOTE — H&P (VIEW-ONLY)
[facility] General Surgery Clinic Note     HPI: Dorita Moran is a 53 y.o. patient with PMHx of HTN, asthma, and bariatric surgery presenting to clinic after being referred from [facility] for intraductal papilloma of the right breast. Patient reports painful RUQ of breast that has been getting hard and red periodically for about a year. She gets a breast exam yearly around October which showed no abnormalities but this year was referred for a biopsy. Patient denies any dimpling or nipple discharge but feels that area pull with deep breathing. No fever, N/V but reports SOB, worsening dizziness and headaches without NSAID relief. When discussing her diagnosis patient expressed desire to have lump removed.    PMH:   Past Medical History:   Diagnosis Date    Anemia, unspecified     Anxiety     Asthma     Hypertension     Insomnia     Sleep apnea, unspecified     Unspecified osteoarthritis, unspecified site       Meds:   Current Outpatient Medications:     ALPRAZolam (XANAX) 1 MG tablet, Take 1 mg by mouth 3 (three) times daily., Disp: , Rfl:     buPROPion (WELLBUTRIN XL) 300 MG 24 hr tablet, Take 300 mg by mouth., Disp: , Rfl:     chlorhexidine (PERIDEX) 0.12 % solution, SMARTSIG:By Mouth, Disp: , Rfl:     cyclobenzaprine (FLEXERIL) 10 MG tablet, Take 10 mg by mouth 3 (three) times daily as needed., Disp: , Rfl:     doxepin (SINEQUAN) 150 MG Cap, Take 150 mg by mouth nightly., Disp: , Rfl:     FLOVENT  mcg/actuation inhaler, Inhale 1 puff into the lungs 2 (two) times daily., Disp: , Rfl:     fluticasone furoate-vilanteroL (BREO) 200-25 mcg/dose DsDv diskus inhaler, Inhale 1 puff into the lungs., Disp: , Rfl:     fluticasone propionate (FLONASE) 50 mcg/actuation nasal spray, 1 spray by Each Nostril route nightly., Disp: , Rfl:     gabapentin (NEURONTIN) 800 MG tablet, Take 800 mg by mouth 4 (four) times daily., Disp: , Rfl:     ipratropium-albuteroL (COMBIVENT)  mcg/actuation inhaler, Inhale 1 puff into the  lungs., Disp: , Rfl:     loratadine (CLARITIN) 10 mg tablet, Take 10 mg by mouth nightly., Disp: , Rfl:     losartan-hydrochlorothiazide 100-25 mg (HYZAAR) 100-25 mg per tablet, Take 1 tablet by mouth., Disp: , Rfl:     lurasidone (LATUDA) 80 mg Tab tablet, Take 80 mg by mouth., Disp: , Rfl:     meclizine (ANTIVERT) 25 MG tablet, Take 32 mg by mouth 3 (three) times daily as needed., Disp: , Rfl:     montelukast (SINGULAIR) 10 mg tablet, SMARTSI Tablet(s) By Mouth Every Evening, Disp: , Rfl:     OXcarbazepine (TRILEPTAL) 300 MG Tab, Take 300 mg by mouth., Disp: , Rfl:     venlafaxine (EFFEXOR-XR) 150 MG Cp24, Take 150 mg by mouth once daily., Disp: , Rfl:     VRAYLAR 3 mg Cap, Take 1 capsule by mouth once daily., Disp: , Rfl:     diethylpropion (TENUATE) 75 mg SR tablet, Take 75 mg by mouth every morning., Disp: , Rfl:     eszopiclone (LUNESTA) 1 MG Tab, Take 3 mg by mouth., Disp: , Rfl:     hydroCHLOROthiazide (HYDRODIURIL) 25 MG tablet, Take 25 mg by mouth every morning., Disp: , Rfl:     predniSONE (DELTASONE) 20 MG tablet, Take 20 mg by mouth., Disp: , Rfl:   No current facility-administered medications for this visit.    Facility-Administered Medications Ordered in Other Visits:     lactated ringers infusion, , Intravenous, Continuous, Sharlene Solis MD, Last Rate: 10 mL/hr at 23 0840, New Bag at 23 0840  Allergies:   Review of patient's allergies indicates:   Allergen Reactions    Naproxen Hives    Tramadol Hives and Itching     Social History:   Social History     Tobacco Use    Smoking status: Never    Smokeless tobacco: Never   Substance Use Topics    Alcohol use: Never    Drug use: Never     Family History:   Family History   Family history unknown: Yes     Surgical History:   Past Surgical History:   Procedure Laterality Date    APPENDECTOMY      BARIATRIC SURGERY      x2    CARPAL TUNNEL RELEASE Left 2023    Procedure: RELEASE, CARPAL TUNNEL;  Surgeon: Jerry Rdoriguez MD;   Location: [facility] OR;  Service: Plastics;  Laterality: Left;     SECTION      INTRAUTERINE DEVICE INSERTION      ULNAR TUNNEL RELEASE Left 2023    Procedure: RELEASE, CUBITAL TUNNEL;  Surgeon: Jerry Rodriguez MD;  Location: [facility] OR;  Service: Plastics;  Laterality: Left;     Review of Systems:  Negative unless specified in HPI above.    Objective:    Vitals:  Vitals:    23 1214   BP: (!) 170/92   Pulse: 65   Resp: 18        Physical Exam:  Gen: NAD  Neuro: awake, alert, answering questions appropriately  CV: RRR, +2 radial pulse  Resp: non-labored breathing, KAREN  Ext: moves all 4 spontaneously and purposefully  Skin: warm, well perfused  Breast: no apparent redness observed    Pertinent Labs:  No current labs.    Imaging:  US (10/30/23)  MM 3D Roverto Mammography (23): BiRADS category 4    Micro/Path/Other:  Right breast biopsy (10/30/23): R. Intraductal papiloma, lobular adenosis    Assessment/Plan:  Dorita Moran is a 53 y.o. patient with PMHx of HTN, asthma, and bariatric surgery presenting to clinic after being referred from [facility] for intraductal papilloma of the right breast. Patient has had normal annual breast exams up until this year but has experienced pain, redness and hardness in RUQ for about a year. She expressed wanting the lump removed.    - Schedule for right breast lumpectomy with magseed   - Risks and benefits explained with patient expressing understanding. Consent obtained in clinic    Erika Rivero  [facility] General Surgery MS3  2023 12:34 PM     RESIDENT ATTESTATION    I have seen and  evaluated the patient with the student doctor. Agree with assessment and plan with following changes:    Juwan Hawkins MD  [facility] General Surgery PGY-1  1:29 PM

## 2023-11-30 NOTE — PROGRESS NOTES
\[facility]\"" General Surgery Clinic Note     HPI: Dorita Moran is a 53 y.o. patient with PMHx of HTN, asthma, and bariatric surgery presenting to clinic after being referred from [facility] for intraductal papilloma of the right breast. Patient reports painful RUQ of breast that has been getting hard and red periodically for about a year. She gets a breast exam yearly around October which showed no abnormalities but this year was referred for a biopsy. Patient denies any dimpling or nipple discharge but feels that area pull with deep breathing. No fever, N/V but reports SOB, worsening dizziness and headaches without NSAID relief. When discussing her diagnosis patient expressed desire to have lump removed.    PMH:   Past Medical History:   Diagnosis Date    Anemia, unspecified     Anxiety     Asthma     Hypertension     Insomnia     Sleep apnea, unspecified     Unspecified osteoarthritis, unspecified site       Meds:   Current Outpatient Medications:     ALPRAZolam (XANAX) 1 MG tablet, Take 1 mg by mouth 3 (three) times daily., Disp: , Rfl:     buPROPion (WELLBUTRIN XL) 300 MG 24 hr tablet, Take 300 mg by mouth., Disp: , Rfl:     chlorhexidine (PERIDEX) 0.12 % solution, SMARTSIG:By Mouth, Disp: , Rfl:     cyclobenzaprine (FLEXERIL) 10 MG tablet, Take 10 mg by mouth 3 (three) times daily as needed., Disp: , Rfl:     doxepin (SINEQUAN) 150 MG Cap, Take 150 mg by mouth nightly., Disp: , Rfl:     FLOVENT  mcg/actuation inhaler, Inhale 1 puff into the lungs 2 (two) times daily., Disp: , Rfl:     fluticasone furoate-vilanteroL (BREO) 200-25 mcg/dose DsDv diskus inhaler, Inhale 1 puff into the lungs., Disp: , Rfl:     fluticasone propionate (FLONASE) 50 mcg/actuation nasal spray, 1 spray by Each Nostril route nightly., Disp: , Rfl:     gabapentin (NEURONTIN) 800 MG tablet, Take 800 mg by mouth 4 (four) times daily., Disp: , Rfl:     ipratropium-albuteroL (COMBIVENT)  mcg/actuation inhaler, Inhale 1 puff into the  lungs., Disp: , Rfl:     loratadine (CLARITIN) 10 mg tablet, Take 10 mg by mouth nightly., Disp: , Rfl:     losartan-hydrochlorothiazide 100-25 mg (HYZAAR) 100-25 mg per tablet, Take 1 tablet by mouth., Disp: , Rfl:     lurasidone (LATUDA) 80 mg Tab tablet, Take 80 mg by mouth., Disp: , Rfl:     meclizine (ANTIVERT) 25 MG tablet, Take 32 mg by mouth 3 (three) times daily as needed., Disp: , Rfl:     montelukast (SINGULAIR) 10 mg tablet, SMARTSI Tablet(s) By Mouth Every Evening, Disp: , Rfl:     OXcarbazepine (TRILEPTAL) 300 MG Tab, Take 300 mg by mouth., Disp: , Rfl:     venlafaxine (EFFEXOR-XR) 150 MG Cp24, Take 150 mg by mouth once daily., Disp: , Rfl:     VRAYLAR 3 mg Cap, Take 1 capsule by mouth once daily., Disp: , Rfl:     diethylpropion (TENUATE) 75 mg SR tablet, Take 75 mg by mouth every morning., Disp: , Rfl:     eszopiclone (LUNESTA) 1 MG Tab, Take 3 mg by mouth., Disp: , Rfl:     hydroCHLOROthiazide (HYDRODIURIL) 25 MG tablet, Take 25 mg by mouth every morning., Disp: , Rfl:     predniSONE (DELTASONE) 20 MG tablet, Take 20 mg by mouth., Disp: , Rfl:   No current facility-administered medications for this visit.    Facility-Administered Medications Ordered in Other Visits:     lactated ringers infusion, , Intravenous, Continuous, Sharlene Solis MD, Last Rate: 10 mL/hr at 23 0840, New Bag at 23 0840  Allergies:   Review of patient's allergies indicates:   Allergen Reactions    Naproxen Hives    Tramadol Hives and Itching     Social History:   Social History     Tobacco Use    Smoking status: Never    Smokeless tobacco: Never   Substance Use Topics    Alcohol use: Never    Drug use: Never     Family History:   Family History   Family history unknown: Yes     Surgical History:   Past Surgical History:   Procedure Laterality Date    APPENDECTOMY      BARIATRIC SURGERY      x2    CARPAL TUNNEL RELEASE Left 2023    Procedure: RELEASE, CARPAL TUNNEL;  Surgeon: Jerry Rodriguez MD;   Location: [facility] OR;  Service: Plastics;  Laterality: Left;     SECTION      INTRAUTERINE DEVICE INSERTION      ULNAR TUNNEL RELEASE Left 2023    Procedure: RELEASE, CUBITAL TUNNEL;  Surgeon: Jerry Rodriguez MD;  Location: [facility] OR;  Service: Plastics;  Laterality: Left;     Review of Systems:  Negative unless specified in HPI above.    Objective:    Vitals:  Vitals:    23 1214   BP: (!) 170/92   Pulse: 65   Resp: 18        Physical Exam:  Gen: NAD  Neuro: awake, alert, answering questions appropriately  CV: RRR, +2 radial pulse  Resp: non-labored breathing, KAREN  Ext: moves all 4 spontaneously and purposefully  Skin: warm, well perfused  Breast: no apparent redness observed    Pertinent Labs:  No current labs.    Imaging:  US (10/30/23)  MM 3D Roverto Mammography (23): BiRADS category 4    Micro/Path/Other:  Right breast biopsy (10/30/23): R. Intraductal papiloma, lobular adenosis    Assessment/Plan:  Dorita Moran is a 53 y.o. patient with PMHx of HTN, asthma, and bariatric surgery presenting to clinic after being referred from [facility] for intraductal papilloma of the right breast. Patient has had normal annual breast exams up until this year but has experienced pain, redness and hardness in RUQ for about a year. She expressed wanting the lump removed.    - Schedule for right breast lumpectomy with magseed   - Risks and benefits explained with patient expressing understanding. Consent obtained in clinic    Erika Rivero  [facility] General Surgery MS3  2023 12:34 PM     RESIDENT ATTESTATION    I have seen and  evaluated the patient with the student doctor. Agree with assessment and plan with following changes:    Juwan Hawkins MD  [facility] General Surgery PGY-1  1:29 PM

## 2023-11-30 NOTE — PROGRESS NOTES
Patient seen by Dr. SHAAN Hawkins. Scheduled for surgery 12/18/23. One Day Surgery Instruction sheet given. Will return for 2 week post op visit. Written and verbal discharge instructions given

## 2023-12-01 NOTE — PROGRESS NOTES
I have reviewed the notes, assessments, and/or procedures performed by the resident, I concur with her/his documentation of Dorita Moran.     Joyce Rascon MD

## 2023-12-13 ENCOUNTER — HOSPITAL ENCOUNTER (OUTPATIENT)
Dept: RADIOLOGY | Facility: HOSPITAL | Age: 53
Discharge: HOME | End: 2023-12-13
Payer: MEDICAID

## 2023-12-13 DIAGNOSIS — D36.9 INTRADUCTAL PAPILLOMA: ICD-10-CM

## 2023-12-13 PROCEDURE — 19285 PERQ DEV BREAST 1ST US IMAG: CPT | Mod: RT

## 2023-12-13 PROCEDURE — 77061 BREAST TOMOSYNTHESIS UNI: CPT | Mod: 26,RT,, | Performed by: RADIOLOGY

## 2023-12-13 PROCEDURE — 77061 MAMMO DIGITAL DIAGNOSTIC RIGHT WITH TOMO: ICD-10-PCS | Mod: 26,RT,, | Performed by: RADIOLOGY

## 2023-12-13 PROCEDURE — 77065 DX MAMMO INCL CAD UNI: CPT | Mod: 26,RT,, | Performed by: RADIOLOGY

## 2023-12-13 PROCEDURE — 19285 PERQ DEV BREAST 1ST US IMAG: CPT | Mod: RT,,, | Performed by: RADIOLOGY

## 2023-12-13 PROCEDURE — 77065 MAMMO DIGITAL DIAGNOSTIC RIGHT WITH TOMO: ICD-10-PCS | Mod: 26,RT,, | Performed by: RADIOLOGY

## 2023-12-13 PROCEDURE — 77065 DX MAMMO INCL CAD UNI: CPT | Mod: TC,RT

## 2023-12-13 PROCEDURE — 19285 US RIGHT MAGSEED LOCALIZATION (BREAST IMAGING): ICD-10-PCS | Mod: RT,,, | Performed by: RADIOLOGY

## 2023-12-13 RX ORDER — LIDOCAINE HYDROCHLORIDE 20 MG/ML
INJECTION, SOLUTION EPIDURAL; INFILTRATION; INTRACAUDAL; PERINEURAL
Status: DISCONTINUED
Start: 2023-12-13 | End: 2023-12-14 | Stop reason: HOSPADM

## 2023-12-15 ENCOUNTER — PATIENT MESSAGE (OUTPATIENT)
Dept: ADMINISTRATIVE | Facility: OTHER | Age: 53
End: 2023-12-15
Payer: MEDICAID

## 2023-12-17 ENCOUNTER — PATIENT MESSAGE (OUTPATIENT)
Dept: ADMINISTRATIVE | Facility: OTHER | Age: 53
End: 2023-12-17
Payer: MEDICAID

## 2023-12-17 ENCOUNTER — ANESTHESIA EVENT (OUTPATIENT)
Dept: SURGERY | Facility: HOSPITAL | Age: 53
End: 2023-12-17
Payer: MEDICAID

## 2023-12-18 ENCOUNTER — ANESTHESIA (OUTPATIENT)
Dept: SURGERY | Facility: HOSPITAL | Age: 53
End: 2023-12-18
Payer: MEDICAID

## 2023-12-18 ENCOUNTER — HOSPITAL ENCOUNTER (OUTPATIENT)
Facility: HOSPITAL | Age: 53
Discharge: HOME | End: 2023-12-18
Attending: SURGERY | Admitting: SURGERY
Payer: MEDICAID

## 2023-12-18 VITALS
SYSTOLIC BLOOD PRESSURE: 136 MMHG | HEART RATE: 73 BPM | DIASTOLIC BLOOD PRESSURE: 74 MMHG | BODY MASS INDEX: 64.38 KG/M2 | TEMPERATURE: 98 F | WEIGHT: 293 LBS | OXYGEN SATURATION: 97 % | RESPIRATION RATE: 20 BRPM

## 2023-12-18 DIAGNOSIS — D36.9 INTRADUCTAL PAPILLOMA: Primary | ICD-10-CM

## 2023-12-18 LAB
B-HCG UR QL: NEGATIVE
CTP QC/QA: YES

## 2023-12-18 PROCEDURE — D9220A PRA ANESTHESIA: ICD-10-PCS | Mod: ANES,,, | Performed by: SPECIALIST

## 2023-12-18 PROCEDURE — 63600175 PHARM REV CODE 636 W HCPCS: Performed by: NURSE ANESTHETIST, CERTIFIED REGISTERED

## 2023-12-18 PROCEDURE — 37000009 HC ANESTHESIA EA ADD 15 MINS: Performed by: SURGERY

## 2023-12-18 PROCEDURE — 71000015 HC POSTOP RECOV 1ST HR: Performed by: SURGERY

## 2023-12-18 PROCEDURE — 63600175 PHARM REV CODE 636 W HCPCS: Performed by: SPECIALIST

## 2023-12-18 PROCEDURE — 36000707: Performed by: SURGERY

## 2023-12-18 PROCEDURE — 36000706: Performed by: SURGERY

## 2023-12-18 PROCEDURE — D9220A PRA ANESTHESIA: Mod: CRNA,,, | Performed by: NURSE ANESTHETIST, CERTIFIED REGISTERED

## 2023-12-18 PROCEDURE — 71000033 HC RECOVERY, INTIAL HOUR: Performed by: SURGERY

## 2023-12-18 PROCEDURE — 25000003 PHARM REV CODE 250: Performed by: SPECIALIST

## 2023-12-18 PROCEDURE — 37000008 HC ANESTHESIA 1ST 15 MINUTES: Performed by: SURGERY

## 2023-12-18 PROCEDURE — 25000003 PHARM REV CODE 250: Performed by: NURSE ANESTHETIST, CERTIFIED REGISTERED

## 2023-12-18 PROCEDURE — D9220A PRA ANESTHESIA: ICD-10-PCS | Mod: CRNA,,, | Performed by: NURSE ANESTHETIST, CERTIFIED REGISTERED

## 2023-12-18 PROCEDURE — 63600175 PHARM REV CODE 636 W HCPCS: Performed by: SURGERY

## 2023-12-18 PROCEDURE — 88307 TISSUE EXAM BY PATHOLOGIST: CPT | Mod: TC | Performed by: SURGERY

## 2023-12-18 PROCEDURE — 71000016 HC POSTOP RECOV ADDL HR: Performed by: SURGERY

## 2023-12-18 PROCEDURE — 25000242 PHARM REV CODE 250 ALT 637 W/ HCPCS: Performed by: SPECIALIST

## 2023-12-18 PROCEDURE — D9220A PRA ANESTHESIA: Mod: ANES,,, | Performed by: SPECIALIST

## 2023-12-18 PROCEDURE — 63600175 PHARM REV CODE 636 W HCPCS

## 2023-12-18 RX ORDER — EPHEDRINE SULFATE 50 MG/ML
INJECTION, SOLUTION INTRAVENOUS
Status: DISCONTINUED | OUTPATIENT
Start: 2023-12-18 | End: 2023-12-18

## 2023-12-18 RX ORDER — IPRATROPIUM BROMIDE AND ALBUTEROL SULFATE 2.5; .5 MG/3ML; MG/3ML
3 SOLUTION RESPIRATORY (INHALATION) ONCE AS NEEDED
Status: COMPLETED | OUTPATIENT
Start: 2023-12-18 | End: 2023-12-18

## 2023-12-18 RX ORDER — DEXAMETHASONE SODIUM PHOSPHATE 4 MG/ML
INJECTION, SOLUTION INTRA-ARTICULAR; INTRALESIONAL; INTRAMUSCULAR; INTRAVENOUS; SOFT TISSUE
Status: DISCONTINUED | OUTPATIENT
Start: 2023-12-18 | End: 2023-12-18

## 2023-12-18 RX ORDER — LIDOCAINE HYDROCHLORIDE 20 MG/ML
INJECTION INTRAVENOUS
Status: DISCONTINUED | OUTPATIENT
Start: 2023-12-18 | End: 2023-12-18

## 2023-12-18 RX ORDER — ONDANSETRON 2 MG/ML
4 INJECTION INTRAMUSCULAR; INTRAVENOUS ONCE
Status: COMPLETED | OUTPATIENT
Start: 2023-12-18 | End: 2023-12-18

## 2023-12-18 RX ORDER — PROCHLORPERAZINE EDISYLATE 5 MG/ML
5 INJECTION INTRAMUSCULAR; INTRAVENOUS ONCE AS NEEDED
Status: DISCONTINUED | OUTPATIENT
Start: 2023-12-18 | End: 2023-12-18 | Stop reason: HOSPADM

## 2023-12-18 RX ORDER — HYDROCODONE BITARTRATE AND ACETAMINOPHEN 5; 325 MG/1; MG/1
1 TABLET ORAL EVERY 6 HOURS PRN
Qty: 12 TABLET | Refills: 0 | Status: SHIPPED | OUTPATIENT
Start: 2023-12-18 | End: 2023-12-18

## 2023-12-18 RX ORDER — SUCCINYLCHOLINE CHLORIDE 20 MG/ML
INJECTION INTRAMUSCULAR; INTRAVENOUS
Status: DISCONTINUED | OUTPATIENT
Start: 2023-12-18 | End: 2023-12-18

## 2023-12-18 RX ORDER — BUPIVACAINE HYDROCHLORIDE 2.5 MG/ML
INJECTION, SOLUTION EPIDURAL; INFILTRATION; INTRACAUDAL
Status: DISCONTINUED | OUTPATIENT
Start: 2023-12-18 | End: 2023-12-18 | Stop reason: HOSPADM

## 2023-12-18 RX ORDER — ONDANSETRON 4 MG/1
4 TABLET, ORALLY DISINTEGRATING ORAL 2 TIMES DAILY
Qty: 28 TABLET | Refills: 0 | Status: SHIPPED | OUTPATIENT
Start: 2023-12-18 | End: 2023-12-18

## 2023-12-18 RX ORDER — FENTANYL CITRATE 50 UG/ML
INJECTION, SOLUTION INTRAMUSCULAR; INTRAVENOUS
Status: DISCONTINUED | OUTPATIENT
Start: 2023-12-18 | End: 2023-12-18

## 2023-12-18 RX ORDER — ONDANSETRON 4 MG/1
4 TABLET, ORALLY DISINTEGRATING ORAL 2 TIMES DAILY
Qty: 28 TABLET | Refills: 0 | Status: SHIPPED | OUTPATIENT
Start: 2023-12-18 | End: 2024-01-01

## 2023-12-18 RX ORDER — HYDROMORPHONE HYDROCHLORIDE 1 MG/ML
0.5 INJECTION, SOLUTION INTRAMUSCULAR; INTRAVENOUS; SUBCUTANEOUS EVERY 5 MIN PRN
Status: DISCONTINUED | OUTPATIENT
Start: 2023-12-18 | End: 2023-12-18 | Stop reason: HOSPADM

## 2023-12-18 RX ORDER — ROCURONIUM BROMIDE 10 MG/ML
INJECTION, SOLUTION INTRAVENOUS
Status: DISCONTINUED | OUTPATIENT
Start: 2023-12-18 | End: 2023-12-18

## 2023-12-18 RX ORDER — CEFAZOLIN SODIUM 1 G/3ML
2 INJECTION, POWDER, FOR SOLUTION INTRAMUSCULAR; INTRAVENOUS ONCE
Status: DISCONTINUED | OUTPATIENT
Start: 2023-12-18 | End: 2023-12-18 | Stop reason: HOSPADM

## 2023-12-18 RX ORDER — PHENYLEPHRINE HYDROCHLORIDE 10 MG/ML
INJECTION INTRAVENOUS
Status: DISCONTINUED | OUTPATIENT
Start: 2023-12-18 | End: 2023-12-18

## 2023-12-18 RX ORDER — CEFAZOLIN SODIUM 1 G/3ML
INJECTION, POWDER, FOR SOLUTION INTRAMUSCULAR; INTRAVENOUS
Status: DISCONTINUED | OUTPATIENT
Start: 2023-12-18 | End: 2023-12-18

## 2023-12-18 RX ORDER — MEPERIDINE HYDROCHLORIDE 25 MG/ML
12.5 INJECTION INTRAMUSCULAR; INTRAVENOUS; SUBCUTANEOUS ONCE
Status: DISCONTINUED | OUTPATIENT
Start: 2023-12-18 | End: 2023-12-18 | Stop reason: HOSPADM

## 2023-12-18 RX ORDER — PROPOFOL 10 MG/ML
VIAL (ML) INTRAVENOUS
Status: DISCONTINUED | OUTPATIENT
Start: 2023-12-18 | End: 2023-12-18

## 2023-12-18 RX ORDER — HYDROMORPHONE HYDROCHLORIDE 1 MG/ML
0.2 INJECTION, SOLUTION INTRAMUSCULAR; INTRAVENOUS; SUBCUTANEOUS EVERY 5 MIN PRN
Status: DISCONTINUED | OUTPATIENT
Start: 2023-12-18 | End: 2023-12-18 | Stop reason: HOSPADM

## 2023-12-18 RX ORDER — DEXMEDETOMIDINE HYDROCHLORIDE 100 UG/ML
INJECTION, SOLUTION INTRAVENOUS
Status: DISCONTINUED | OUTPATIENT
Start: 2023-12-18 | End: 2023-12-18

## 2023-12-18 RX ORDER — MIDAZOLAM HYDROCHLORIDE 1 MG/ML
2 INJECTION INTRAMUSCULAR; INTRAVENOUS ONCE AS NEEDED
Status: COMPLETED | OUTPATIENT
Start: 2023-12-18 | End: 2023-12-18

## 2023-12-18 RX ORDER — DIPHENHYDRAMINE HYDROCHLORIDE 50 MG/ML
25 INJECTION INTRAMUSCULAR; INTRAVENOUS ONCE AS NEEDED
Status: DISCONTINUED | OUTPATIENT
Start: 2023-12-18 | End: 2023-12-18 | Stop reason: HOSPADM

## 2023-12-18 RX ORDER — HEPARIN SODIUM 5000 [USP'U]/ML
5000 INJECTION, SOLUTION INTRAVENOUS; SUBCUTANEOUS ONCE
Status: COMPLETED | OUTPATIENT
Start: 2023-12-18 | End: 2023-12-18

## 2023-12-18 RX ORDER — SODIUM CHLORIDE, SODIUM LACTATE, POTASSIUM CHLORIDE, CALCIUM CHLORIDE 600; 310; 30; 20 MG/100ML; MG/100ML; MG/100ML; MG/100ML
INJECTION, SOLUTION INTRAVENOUS CONTINUOUS
Status: DISCONTINUED | OUTPATIENT
Start: 2023-12-18 | End: 2023-12-18 | Stop reason: HOSPADM

## 2023-12-18 RX ORDER — OXYCODONE AND ACETAMINOPHEN 5; 325 MG/1; MG/1
2 TABLET ORAL ONCE
Status: COMPLETED | OUTPATIENT
Start: 2023-12-18 | End: 2023-12-18

## 2023-12-18 RX ORDER — KETAMINE HCL IN 0.9 % NACL 50 MG/5 ML
SYRINGE (ML) INTRAVENOUS
Status: DISCONTINUED | OUTPATIENT
Start: 2023-12-18 | End: 2023-12-18

## 2023-12-18 RX ORDER — HYDROCODONE BITARTRATE AND ACETAMINOPHEN 5; 325 MG/1; MG/1
1 TABLET ORAL EVERY 6 HOURS PRN
Qty: 12 TABLET | Refills: 0 | Status: SHIPPED | OUTPATIENT
Start: 2023-12-18

## 2023-12-18 RX ORDER — ONDANSETRON 2 MG/ML
INJECTION INTRAMUSCULAR; INTRAVENOUS
Status: DISCONTINUED | OUTPATIENT
Start: 2023-12-18 | End: 2023-12-18

## 2023-12-18 RX ADMIN — SUGAMMADEX 200 MG: 100 INJECTION, SOLUTION INTRAVENOUS at 10:12

## 2023-12-18 RX ADMIN — IPRATROPIUM BROMIDE AND ALBUTEROL SULFATE 3 ML: .5; 3 SOLUTION RESPIRATORY (INHALATION) at 08:12

## 2023-12-18 RX ADMIN — SODIUM CHLORIDE, POTASSIUM CHLORIDE, SODIUM LACTATE AND CALCIUM CHLORIDE: 600; 310; 30; 20 INJECTION, SOLUTION INTRAVENOUS at 08:12

## 2023-12-18 RX ADMIN — DEXMEDETOMIDINE 10 MCG: 200 INJECTION, SOLUTION INTRAVENOUS at 09:12

## 2023-12-18 RX ADMIN — HYDROMORPHONE HYDROCHLORIDE 0.5 MG: 1 INJECTION, SOLUTION INTRAMUSCULAR; INTRAVENOUS; SUBCUTANEOUS at 11:12

## 2023-12-18 RX ADMIN — SUCCINYLCHOLINE CHLORIDE 160 MG: 20 INJECTION, SOLUTION INTRAMUSCULAR; INTRAVENOUS; PARENTERAL at 09:12

## 2023-12-18 RX ADMIN — ONDANSETRON 4 MG: 2 INJECTION INTRAMUSCULAR; INTRAVENOUS at 09:12

## 2023-12-18 RX ADMIN — MIDAZOLAM HYDROCHLORIDE 2 MG: 1 INJECTION, SOLUTION INTRAMUSCULAR; INTRAVENOUS at 08:12

## 2023-12-18 RX ADMIN — OXYCODONE HYDROCHLORIDE AND ACETAMINOPHEN 2 TABLET: 5; 325 TABLET ORAL at 11:12

## 2023-12-18 RX ADMIN — HEPARIN SODIUM 5000 UNITS: 5000 INJECTION, SOLUTION INTRAVENOUS; SUBCUTANEOUS at 06:12

## 2023-12-18 RX ADMIN — PROPOFOL 200 MG: 10 INJECTION, EMULSION INTRAVENOUS at 09:12

## 2023-12-18 RX ADMIN — LIDOCAINE HYDROCHLORIDE 100 MG: 20 INJECTION INTRAVENOUS at 09:12

## 2023-12-18 RX ADMIN — FENTANYL CITRATE 50 MCG: 50 INJECTION INTRAMUSCULAR; INTRAVENOUS at 09:12

## 2023-12-18 RX ADMIN — ROCURONIUM BROMIDE 30 MG: 10 INJECTION INTRAVENOUS at 09:12

## 2023-12-18 RX ADMIN — DEXMEDETOMIDINE 10 MCG: 200 INJECTION, SOLUTION INTRAVENOUS at 10:12

## 2023-12-18 RX ADMIN — EPHEDRINE SULFATE 25 MG: 50 INJECTION INTRAVENOUS at 09:12

## 2023-12-18 RX ADMIN — ONDANSETRON 4 MG: 2 INJECTION INTRAMUSCULAR; INTRAVENOUS at 11:12

## 2023-12-18 RX ADMIN — PHENYLEPHRINE HYDROCHLORIDE 200 MCG: 10 INJECTION INTRAVENOUS at 09:12

## 2023-12-18 RX ADMIN — DEXAMETHASONE SODIUM PHOSPHATE 8 MG: 4 INJECTION, SOLUTION INTRA-ARTICULAR; INTRALESIONAL; INTRAMUSCULAR; INTRAVENOUS; SOFT TISSUE at 09:12

## 2023-12-18 RX ADMIN — Medication 30 MG: at 09:12

## 2023-12-18 RX ADMIN — ROCURONIUM BROMIDE 10 MG: 10 INJECTION INTRAVENOUS at 09:12

## 2023-12-18 RX ADMIN — CEFAZOLIN 3 G: 330 INJECTION, POWDER, FOR SOLUTION INTRAMUSCULAR; INTRAVENOUS at 09:12

## 2023-12-18 NOTE — INTERVAL H&P NOTE
H&P Update    Patient seen and examined this morning. There are no changes to the documented H&P.    Patient has held home blood thinners for appropriate amount of time: N/A    Planned procedure: LUMPECTOMY,BREAST,WITH RADIOACTIVE SEED LOCALIZATION    Positioning: Supine    Pre-operative Heparin Ordered: Yes    Pre-operative Antibiotics Ordered: Yes: Ancef    Laterality Marked: Yes    Juwan Hawkins MD  6:20 AM  12/18/2023

## 2023-12-18 NOTE — ANESTHESIA PROCEDURE NOTES
Intubation    Date/Time: 12/18/2023 9:02 AM    Performed by: Priyank Gentile CRNA  Authorized by: Ros Wang MD    Intubation:     Induction:  Rapid sequence induction    Intubated:  Postinduction    Mask Ventilation:  Not attempted    Attempts:  1    Attempted By:  CRNA    Method of Intubation:  Video laryngoscopy    Blade:  Glidescope 4    Laryngeal View Grade: Grade IIA - cords partially seen      Difficult Airway Encountered?: No      Complications:  None    Airway Device:  Oral endotracheal tube    Airway Device Size:  7.5    Style/Cuff Inflation:  Cuffed (inflated to minimal occlusive pressure)    Inflation Amount (mL):  8    Tube secured:  22    Secured at:  The lips    Placement Verified By:  Capnometry    Complicating Factors:  None    Findings Post-Intubation:  BS equal bilateral and atraumatic/condition of teeth unchanged

## 2023-12-18 NOTE — ANESTHESIA POSTPROCEDURE EVALUATION
Anesthesia Post Evaluation    Patient: Dorita Moran    Procedure(s) Performed: Procedure(s) (LRB):  LUMPECTOMY,BREAST,WITH RADIOACTIVE SEED LOCALIZATION (Right)    Final Anesthesia Type: general      Patient location during evaluation: PACU  Patient participation: Yes- Able to Participate  Level of consciousness: awake and responds to stimulation  Post-procedure vital signs: reviewed and stable  Pain management: adequate  Airway patency: patent    PONV status at discharge: No PONV  Anesthetic complications: no      Cardiovascular status: blood pressure returned to baseline  Respiratory status: unassisted  Hydration status: euvolemic  Follow-up not needed.              Vitals Value Taken Time   /58 12/18/23 1105   Temp 36.1 °C (97 °F) 12/18/23 1100   Pulse 72 12/18/23 1105   Resp 18 12/18/23 1105   SpO2 99 % 12/18/23 1105         No case tracking events are documented in the log.      Pain/Radha Score: Radha Score: 4 (12/18/2023 11:00 AM)

## 2023-12-18 NOTE — TRANSFER OF CARE
Anesthesia Transfer of Care Note    Patient: Dorita Moran    Procedure(s) Performed: Procedure(s) (LRB):  LUMPECTOMY,BREAST,WITH RADIOACTIVE SEED LOCALIZATION (Right)    Patient location: PACU    Anesthesia Type: general    Transport from OR: Transported from OR on room air with adequate spontaneous ventilation    Post pain: adequate analgesia    Post assessment: no apparent anesthetic complications    Post vital signs: stable    Level of consciousness: awake    Nausea/Vomiting: no nausea/vomiting    Complications: none    Transfer of care protocol was followed      Last vitals: Visit Vitals  /84   Pulse 88   Temp 36.3 °C (97.3 °F) (Temporal)   Resp 20   Wt (!) 159.7 kg (352 lb)   SpO2 100%   Breastfeeding No   BMI 64.38 kg/m²

## 2023-12-18 NOTE — PLAN OF CARE
Problem: Adult Inpatient Plan of Care  Goal: Plan of Care Review  12/18/2023 1403 by Trevon Way RN  Outcome: Met  12/18/2023 1403 by Trevon Way RN  Outcome: Ongoing, Progressing  12/18/2023 1341 by Trevon Way RN  Outcome: Ongoing, Progressing  Goal: Patient-Specific Goal (Individualized)  12/18/2023 1403 by Trevon Way RN  Outcome: Met  12/18/2023 1403 by Trevon Way RN  Outcome: Ongoing, Progressing  12/18/2023 1341 by Trevon Way RN  Outcome: Ongoing, Progressing  Goal: Absence of Hospital-Acquired Illness or Injury  12/18/2023 1403 by Trevon Way RN  Outcome: Met  12/18/2023 1403 by Trevon Way RN  Outcome: Ongoing, Progressing  12/18/2023 1341 by Trevon Way RN  Outcome: Ongoing, Progressing  Goal: Optimal Comfort and Wellbeing  12/18/2023 1403 by Trevon Way RN  Outcome: Met  12/18/2023 1403 by Trevon Way RN  Outcome: Ongoing, Progressing  12/18/2023 1341 by Trevon Way RN  Outcome: Ongoing, Progressing  Goal: Readiness for Transition of Care  12/18/2023 1403 by Trevon Way RN  Outcome: Met  12/18/2023 1403 by Trevon Way RN  Outcome: Ongoing, Progressing  12/18/2023 1341 by Trevon Way RN  Outcome: Ongoing, Progressing     Problem: Bariatric Environmental Safety  Goal: Safety Maintained with Care  12/18/2023 1403 by Trevon Way RN  Outcome: Met  12/18/2023 1403 by Trevon Way RN  Outcome: Ongoing, Progressing  12/18/2023 1341 by Trevon Way RN  Outcome: Ongoing, Progressing

## 2023-12-18 NOTE — ANESTHESIA PREPROCEDURE EVALUATION
2023  Dorita Moran is a 53 y.o., female.  LUMPECTOMY,BREAST,WITH RADIOACTIVE SEED LOCALIZATION (Right)     Vitals:    23 0547 23 0556 23 0630   BP:  (!) 159/86 (!) 159/86   Pulse:  72    Temp:  37.2 °C (98.9 °F)    TempSrc:  Oral    SpO2:  99%    Weight: (!) 159.7 kg (352 lb)            PMH of Asthma, SMO, HTN, LETTY      Active Ambulatory Problems     Diagnosis Date Noted    Depression 2023    Asthma 2023    Intraductal papilloma 2023     Resolved Ambulatory Problems     Diagnosis Date Noted    No Resolved Ambulatory Problems     Past Medical History:   Diagnosis Date    Anemia, unspecified     Anxiety     Hypertension     Insomnia     Sleep apnea, unspecified     Unspecified osteoarthritis, unspecified site        Past Surgical History:   Procedure Laterality Date    APPENDECTOMY      BARIATRIC SURGERY      x2    CARPAL TUNNEL RELEASE Left 2023    Procedure: RELEASE, CARPAL TUNNEL;  Surgeon: Jerry Rodriguez MD;  Location: [facility] OR;  Service: Plastics;  Laterality: Left;     SECTION      INTRAUTERINE DEVICE INSERTION      ULNAR TUNNEL RELEASE Left 2023    Procedure: RELEASE, CUBITAL TUNNEL;  Surgeon: Jerry Rodriguez MD;  Location: [facility] OR;  Service: Plastics;  Laterality: Left;         Lab Results   Component Value Date    WBC 5.6 2021    HGB 9.7 (L) 2021    HCT 32.0 (L) 2021     2021    CHOL 156 2020    TRIG 50 2020    HDL 62 (H) 2020    ALT 10 2020    AST 15 2020     2020    K 4.0 2020    CREATININE 0.60 2020    BUN 11.0 2020    CO2 24 2020    HGBA1C 6.0 (H) 2020        Current Facility-Administered Medications on File Prior to Encounter   Medication Dose Route Frequency Provider Last Rate Last Admin    lactated ringers infusion    Intravenous Continuous Sharlene Solis MD 10 mL/hr at 23 0840 New Bag at 23 0840     Current Outpatient Medications on File Prior to Encounter   Medication Sig Dispense Refill    ALPRAZolam (XANAX) 1 MG tablet Take 1 mg by mouth 3 (three) times daily.      buPROPion (WELLBUTRIN XL) 300 MG 24 hr tablet Take 300 mg by mouth.      doxepin (SINEQUAN) 150 MG Cap Take 150 mg by mouth nightly.      FLOVENT  mcg/actuation inhaler Inhale 1 puff into the lungs 2 (two) times daily.      fluticasone furoate-vilanteroL (BREO) 200-25 mcg/dose DsDv diskus inhaler Inhale 1 puff into the lungs.      gabapentin (NEURONTIN) 800 MG tablet Take 800 mg by mouth 4 (four) times daily.      ipratropium-albuteroL (COMBIVENT)  mcg/actuation inhaler Inhale 1 puff into the lungs.      losartan-hydrochlorothiazide 100-25 mg (HYZAAR) 100-25 mg per tablet Take 1 tablet by mouth.      meclizine (ANTIVERT) 25 MG tablet Take 32 mg by mouth 3 (three) times daily as needed.      montelukast (SINGULAIR) 10 mg tablet SMARTSI Tablet(s) By Mouth Every Evening      OXcarbazepine (TRILEPTAL) 300 MG Tab Take 300 mg by mouth.      venlafaxine (EFFEXOR-XR) 150 MG Cp24 Take 150 mg by mouth once daily.      VRAYLAR 3 mg Cap Take 1 capsule by mouth once daily.      chlorhexidine (PERIDEX) 0.12 % solution SMARTSIG:By Mouth      cyclobenzaprine (FLEXERIL) 10 MG tablet Take 10 mg by mouth 3 (three) times daily as needed.      diethylpropion (TENUATE) 75 mg SR tablet Take 75 mg by mouth every morning.      eszopiclone (LUNESTA) 1 MG Tab Take 3 mg by mouth.      fluticasone propionate (FLONASE) 50 mcg/actuation nasal spray 1 spray by Each Nostril route nightly.      hydroCHLOROthiazide (HYDRODIURIL) 25 MG tablet Take 25 mg by mouth every morning.      loratadine (CLARITIN) 10 mg tablet Take 10 mg by mouth nightly.      lurasidone (LATUDA) 80 mg Tab tablet Take 80 mg by mouth.      predniSONE (DELTASONE) 20 MG tablet Take 20 mg by mouth.            Pre-op Assessment    I have reviewed the Patient Summary Reports.     I have reviewed the Nursing Notes. I have reviewed the NPO Status.   I have reviewed the Medications.     Review of Systems  Anesthesia Hx:  No problems with previous Anesthesia   History of prior surgery of interest to airway management or planning:          Denies Family Hx of Anesthesia complications.    Denies Personal Hx of Anesthesia complications.                    Hematology/Oncology:  Hematology Normal   Oncology Normal                                   EENT/Dental:  EENT/Dental Normal           Cardiovascular:  Cardiovascular Normal                                            Pulmonary:  Pulmonary Normal                       Renal/:  Renal/ Normal                 Hepatic/GI:  Hepatic/GI Normal                 Musculoskeletal:  Musculoskeletal Normal                Neurological:  Neurology Normal                                      Endocrine:  Endocrine Normal            Dermatological:  Skin Normal    Psych:  Psychiatric Normal                    Physical Exam  General: Well nourished, Cooperative, Alert and Oriented    Airway:  Mallampati: IV   Mouth Opening: Normal  TM Distance: Normal  Tongue: Large  Neck ROM: Normal ROM    Dental:  Intact        Anesthesia Plan  Type of Anesthesia, risks & benefits discussed:    Anesthesia Type: Gen ETT  Intra-op Monitoring Plan: Standard ASA Monitors  Post Op Pain Control Plan: multimodal analgesia and IV/PO Opioids PRN  Induction:  IV  Airway Plan: Direct  Informed Consent: Informed consent signed with the Patient and all parties understand the risks and agree with anesthesia plan.  All questions answered. Patient consented to blood products? No  ASA Score: 3  Day of Surgery Review of History & Physical: H&P Update referred to the surgeon/provider.    Ready For Surgery From Anesthesia Perspective.     .

## 2023-12-18 NOTE — OP NOTE
DATE OF PROCEDURE: 12/18/2023    SURGEON: Joyce Rascon M.D.    ASSISTANT:  Juwan Hawkins MD    PREOPERATIVE DIAGNOSIS: Intraductal papilloma, right    POSTOPERATIVE DIAGNOSIS: Post-Op Diagnosis Codes:     * Intraductal papilloma [D36.9]     ANESTHESIA: Choice Anesthesiologist: Ros Wang MD  CRNA: Priyank Gentile CRNA     PROCEDURES PERFORMED:   1. Right breast excisional biopsy with MagSeed localization     LUMPECTOMY,BREAST,WITH RADIOACTIVE SEED LOCALIZATION:        PROCEDURE IN DETAIL:   Dorita Moran is a 53 y.o. female brought to the operating room for definitive surgical management of recent core biopsy revealing intraductal papilloma of the right breast. The patient has elected to undergo excisional biopsy for further evaluation. The patient was informed of the possible risks and complications of the procedure, including but not limited to anesthetic risks, bleeding, infection, and need for additional surgery. The patient concurred with the proposed plan, and has given informed consent. The site of surgery was properly noted/marked in the preoperative holding area.    The patient underwent informed consent. The MagSeed was placed in the upper outer quadrant of the right breast. The MagSeed localization films were reviewed.    She was then brought to the Operating Room and placed in the supine position. Anesthesia was administered. The right breast, anterior chest, arm and axilla were then prepped and draped in a sterile fashion.     Attention was turned to the right breast itself. An incision was made in the upper outer quadrant of the right breast over the anticipated tract of the seed. The specimen was dissected circumferentially around the seed and area of concern. The dissection was carried out circumferentially to include the seed. The specimen was completely dissected free. The seed localized specimen was marked with sterile margin charms and submitted for specimen radiograph. Specimen  radiograph confirmed the seed, clip and area of interest were within the specimen.    Within the lumpectomy cavity, hemostasis was achieved with cautery. The wound was irrigated until clear. There was no evidence of bleeding. It was closed in multiple layers with deep dermal and subcutaneous interrupted 3-0 Monocryl sutures and a running 4-0 Monocryl subcuticular skin closure. Dermabond was applied followed by sterile dressings.    All instruments and sponge counts were correct at the end of the procedure.         ESTIMATED BLOOD LOSS: 10 ml    Implants: * No implants in log *    Specimens:   Specimen (24h ago, onward)       Start     Ordered    12/18/23 1012  Specimen to Pathology  RELEASE UPON ORDERING        References:    Click here for ordering Quick Tip   Question:  Release to patient  Answer:  Immediate    12/18/23 1012                   ID Type Source Tests Collected by Time Destination   A : right breast excisional biopsy Tissue Breast, Right SPECIMEN TO PATHOLOGY Joyce Rascon MD 12/18/2023 0949                 Condition: Good    Disposition: PACU - hemodynamically stable.    Attestation: I was present and scrubbed for the entire procedure.      Juwan Hawkins MD  [employer] General Surgery PGY-1  11:03 AM

## 2023-12-18 NOTE — DISCHARGE SUMMARY
[facility]  Discharge Note  Short Stay    Procedure(s) (LRB):  LUMPECTOMY,BREAST,WITH RADIOACTIVE SEED LOCALIZATION (Right)      OUTCOME: Patient tolerated treatment/procedure well without complication and is now ready for discharge.    DISPOSITION: Home or Self Care    FINAL DIAGNOSIS:  Intraductal papilloma    FOLLOWUP: In clinic    DISCHARGE INSTRUCTIONS:    Discharge Procedure Orders   Diet Adult Regular     Notify your health care provider if you experience any of the following:  temperature >100.4     Notify your health care provider if you experience any of the following:  redness, tenderness, or signs of infection (pain, swelling, redness, odor or green/yellow discharge around incision site)     Leave dressing on - Keep it clean, dry, and intact until clinic visit     Activity as tolerated        TIME SPENT ON DISCHARGE: 15 minutes    Ekaterina Quinones MD  [employer] General Surgery PGY1

## 2023-12-18 NOTE — DISCHARGE INSTRUCTIONS
· Keep follow up appointment on January 4th  at 12:40 pm [facility].      ` Resume home medications unless otherwise instructed by your doctor.    · No heavy lifting or straining over 15-20 pounds for 2 weeks.    · You may remove gauze tomorrow and take a shower starting tomorrow evening. Wash GENTLY with soap and water (do not scrub) over incision, rinse with water, and pat dry.  Wear the surgery bra or any supportive bra without wires unless showering to provide support to the incision site for the next week    · Do not soak your wound in water for two weeks. Do not take baths, swim, or use a hot tub until your doctor says it is okay.    · Use pain medications as instructed. Do not take Tylenol (acetaminophen) with your NORCO, since NORCO contains Tylenol as well. You may alternate Ibuprofen and Tylenol every 4-6 hours    · You may use an ice pack as needed for 20 minutes at a time over your incision site to minimize swelling and help relieve pain.    · Do not drink alcohol or drive today, or as long as you are on pain medication.    · Notify MD of any moderate to severe pain unrelieved by pain medicine, if your incision opens and/or bleeds, or for any signs of infection including fever above 100.4, excessive redness or swelling, yellow/green foul- smelling drainage, nausea or vomiting. Clinics number is [phone]. If it is after business hours or emergency call [phone] and state Im having post op complications and need to speak to the surgeon on call.    · Thanks for choosing [facility]! Have a smooth recovery!

## 2023-12-18 NOTE — PLAN OF CARE
Problem: Adult Inpatient Plan of Care  Goal: Plan of Care Review  12/18/2023 1403 by Trevon Way RN  Outcome: Ongoing, Progressing  12/18/2023 1341 by Trevon Way RN  Outcome: Ongoing, Progressing  Goal: Patient-Specific Goal (Individualized)  12/18/2023 1403 by Trevon Way RN  Outcome: Ongoing, Progressing  12/18/2023 1341 by Trevon Way RN  Outcome: Ongoing, Progressing  Goal: Absence of Hospital-Acquired Illness or Injury  12/18/2023 1403 by Trevon Way RN  Outcome: Ongoing, Progressing  12/18/2023 1341 by Trevon Way RN  Outcome: Ongoing, Progressing  Goal: Optimal Comfort and Wellbeing  12/18/2023 1403 by Trevon Way RN  Outcome: Ongoing, Progressing  12/18/2023 1341 by Trevon Way RN  Outcome: Ongoing, Progressing  Goal: Readiness for Transition of Care  12/18/2023 1403 by Trevon Way RN  Outcome: Ongoing, Progressing  12/18/2023 1341 by Trevon Way RN  Outcome: Ongoing, Progressing     Problem: Bariatric Environmental Safety  Goal: Safety Maintained with Care  12/18/2023 1403 by Trevon Way RN  Outcome: Ongoing, Progressing  12/18/2023 1341 by Trevon Way RN  Outcome: Ongoing, Progressing

## 2023-12-19 ENCOUNTER — PATIENT MESSAGE (OUTPATIENT)
Dept: ADMINISTRATIVE | Facility: OTHER | Age: 53
End: 2023-12-19
Payer: MEDICAID

## 2023-12-20 ENCOUNTER — PATIENT MESSAGE (OUTPATIENT)
Dept: ADMINISTRATIVE | Facility: OTHER | Age: 53
End: 2023-12-20
Payer: MEDICAID

## 2023-12-21 LAB
ESTROGEN SERPL-MCNC: NORMAL PG/ML
INSULIN SERPL-ACNC: NORMAL U[IU]/ML
LAB AP CLINICAL INFORMATION: NORMAL
LAB AP GROSS DESCRIPTION: NORMAL
LAB AP REPORT FOOTNOTES: NORMAL
T3RU NFR SERPL: NORMAL %

## 2024-01-04 ENCOUNTER — OFFICE VISIT (OUTPATIENT)
Dept: SURGERY | Facility: CLINIC | Age: 54
End: 2024-01-04
Payer: MEDICAID

## 2024-01-04 VITALS
SYSTOLIC BLOOD PRESSURE: 126 MMHG | HEART RATE: 82 BPM | TEMPERATURE: 98 F | OXYGEN SATURATION: 100 % | BODY MASS INDEX: 53.92 KG/M2 | HEIGHT: 62 IN | DIASTOLIC BLOOD PRESSURE: 80 MMHG | WEIGHT: 293 LBS | RESPIRATION RATE: 19 BRPM

## 2024-01-04 DIAGNOSIS — D24.1 INTRADUCTAL PAPILLOMA OF BREAST, RIGHT: ICD-10-CM

## 2024-01-04 DIAGNOSIS — Z09 POSTOP CHECK: Primary | ICD-10-CM

## 2024-01-04 PROCEDURE — 3079F DIAST BP 80-89 MM HG: CPT | Mod: CPTII,,, | Performed by: STUDENT IN AN ORGANIZED HEALTH CARE EDUCATION/TRAINING PROGRAM

## 2024-01-04 PROCEDURE — 99024 POSTOP FOLLOW-UP VISIT: CPT | Mod: ,,, | Performed by: STUDENT IN AN ORGANIZED HEALTH CARE EDUCATION/TRAINING PROGRAM

## 2024-01-04 PROCEDURE — 3074F SYST BP LT 130 MM HG: CPT | Mod: CPTII,,, | Performed by: STUDENT IN AN ORGANIZED HEALTH CARE EDUCATION/TRAINING PROGRAM

## 2024-01-04 PROCEDURE — 1159F MED LIST DOCD IN RCRD: CPT | Mod: CPTII,,, | Performed by: STUDENT IN AN ORGANIZED HEALTH CARE EDUCATION/TRAINING PROGRAM

## 2024-01-04 PROCEDURE — 1160F RVW MEDS BY RX/DR IN RCRD: CPT | Mod: CPTII,,, | Performed by: STUDENT IN AN ORGANIZED HEALTH CARE EDUCATION/TRAINING PROGRAM

## 2024-01-04 PROCEDURE — 99215 OFFICE O/P EST HI 40 MIN: CPT | Mod: PBBFAC | Performed by: STUDENT IN AN ORGANIZED HEALTH CARE EDUCATION/TRAINING PROGRAM

## 2024-01-04 NOTE — PROGRESS NOTES
"[facility]  Breast Surgical Oncology  Follow-Up Patient Office Visit       Referring Provider: No ref. provider found  PCP: Shravan Nj DO     Chief Complaint: S/P right breast excisional biopsy        Subjective:   HPI:  Dorita Moran is a 53 y.o. female who presents today to the breast center for her postoperative visit following R breast excisional biopsy on 12/18/23. She has done well post-operatively. She denies any fever or chills. No erythema.     Pathology:  Final Diagnosis      Right breast, excisional biopsy:      - Intraductal papilloma, completely excised.  - Fibrocystic change with usual ductal hyperplasia, adenosis, dilated ducts,  - Fibroadenomatoid change.       Review of Systems:  12 point Review of systems conducted, negative as stated in the HPI. See HPI for details.     Objective:     Vitals:  Visit Vitals  /80   Pulse 82   Temp 97.9 °F (36.6 °C) (Oral)   Resp 19   Ht 5' 2" (1.575 m)   Wt (!) 166.5 kg (367 lb)   SpO2 100%   BMI 67.13 kg/m²         Physical Exam:  Upon inspection of the breast the right breast incision(s) appears well healed with no signs of seroma, hematoma or infection.    Assessment and Plan:     Dorita Moran is a 53 y.o. female who presents to the Breast Center postoperatively after R breast excisional biopsy on 12/18/24 for a papilloma, which is all benign. She is doing well and denies any issues with her surgical site which is healing well. She can return to annual screening mammogram which will be due in October 2024. She states she does not need an order. She can FU here PRN. All questions answered.     Karina Shin MD  Breast Surgical Oncology             "

## 2024-05-07 ENCOUNTER — HOSPITAL ENCOUNTER (EMERGENCY)
Facility: HOSPITAL | Age: 54
Discharge: HOME | End: 2024-05-07
Attending: EMERGENCY MEDICINE
Payer: MEDICAID

## 2024-05-07 VITALS
TEMPERATURE: 99 F | DIASTOLIC BLOOD PRESSURE: 86 MMHG | RESPIRATION RATE: 18 BRPM | BODY MASS INDEX: 53.92 KG/M2 | OXYGEN SATURATION: 100 % | HEART RATE: 81 BPM | HEIGHT: 62 IN | WEIGHT: 293 LBS | SYSTOLIC BLOOD PRESSURE: 172 MMHG

## 2024-05-07 DIAGNOSIS — M25.569 KNEE PAIN: ICD-10-CM

## 2024-05-07 PROCEDURE — 99283 EMERGENCY DEPT VISIT LOW MDM: CPT | Mod: 25

## 2024-05-07 RX ORDER — HYDROCODONE BITARTRATE AND ACETAMINOPHEN 7.5; 325 MG/1; MG/1
1 TABLET ORAL EVERY 6 HOURS PRN
Qty: 12 TABLET | Refills: 0 | Status: SHIPPED | OUTPATIENT
Start: 2024-05-07

## 2024-05-07 NOTE — ED PROVIDER NOTES
"Encounter Date: 2024       History     Chief Complaint   Patient presents with    Knee Pain     C/o of right knee pain. States was walking and said it "snapped". Denies falling, trauma, numb/tingling, and heavy lifting. Pt limping in triage. No swelling or obvious deformity noted.      54 y.o.  female with a history of anxiety, hypertension, and OA presents to  Emergency Department with a chief complaint of atraumatic R knee pain. Symptoms began several months ago and have been recurrent since onset. Reports while walking she heard a "snap" and pain has been persistent. Patient went to [facility] ortho clinic previously for R knee pain; has received joint injections before. Associated symptoms include none. Symptoms are aggravated with palpation and exertion and there are no alleviating factors. The patient denies CP, SOB, fever, chills injury/trauma, or abdominal pain. No other reported symptoms at this time      The history is provided by the patient. No  was used.   Knee Pain  This is a recurrent problem. The problem occurs daily. The problem has not changed since onset.Pertinent negatives include no chest pain, no abdominal pain, no headaches and no shortness of breath. The symptoms are aggravated by exertion. She has tried nothing for the symptoms.     Review of patient's allergies indicates:   Allergen Reactions    Naproxen Hives    Tramadol Hives and Itching     Past Medical History:   Diagnosis Date    Anemia, unspecified     Anxiety     Asthma     Hypertension     Insomnia     Sleep apnea, unspecified     Unspecified osteoarthritis, unspecified site      Past Surgical History:   Procedure Laterality Date    APPENDECTOMY      BARIATRIC SURGERY      x2    CARPAL TUNNEL RELEASE Left 2023    Procedure: RELEASE, CARPAL TUNNEL;  Surgeon: Jerry Rodriguez MD;  Location: [facility];  Service: Plastics;  Laterality: Left;     SECTION      INTRAUTERINE DEVICE INSERTION   "    LUMPECTOMY,BREAST,WITH RADIOACTIVE SEED LOCALIZATION Right 12/18/2023    Procedure: LUMPECTOMY,BREAST,WITH RADIOACTIVE SEED LOCALIZATION;  Surgeon: Joyce Rascon MD;  Location: [facility] OR;  Service: General;  Laterality: Right;    ULNAR TUNNEL RELEASE Left 4/6/2023    Procedure: RELEASE, CUBITAL TUNNEL;  Surgeon: Jerry Rodriguez MD;  Location: [facility] OR;  Service: Plastics;  Laterality: Left;     Family History   Family history unknown: Yes     Social History     Tobacco Use    Smoking status: Never    Smokeless tobacco: Never   Substance Use Topics    Alcohol use: Never    Drug use: Never     Review of Systems   Constitutional:  Negative for chills, fatigue and fever.   Eyes:  Negative for photophobia and visual disturbance.   Respiratory:  Negative for cough, shortness of breath and stridor.    Cardiovascular:  Negative for chest pain, palpitations and leg swelling.   Gastrointestinal:  Negative for abdominal pain, diarrhea, nausea and vomiting.   Musculoskeletal:  Positive for arthralgias. Negative for gait problem and joint swelling.   Skin:  Negative for color change and wound.   Neurological:  Negative for dizziness, syncope, weakness and headaches.   All other systems reviewed and are negative.      Physical Exam     Initial Vitals [05/07/24 1640]   BP Pulse Resp Temp SpO2   (!) 172/86 81 18 98.5 °F (36.9 °C) 100 %      MAP       --         Physical Exam    Nursing note and vitals reviewed.  Constitutional: She appears well-developed and well-nourished. She is not diaphoretic. She is Obese . She is cooperative.  Non-toxic appearance. No distress.   HENT:   Head: Normocephalic and atraumatic.   Right Ear: External ear normal.   Left Ear: External ear normal.   Nose: Nose normal.   Eyes: Conjunctivae and EOM are normal. Pupils are equal, round, and reactive to light.   Neck: Neck supple.   Normal range of motion.  Cardiovascular:  Normal rate, regular rhythm, S1 normal, S2 normal, normal heart sounds,  intact distal pulses and normal pulses.           Pulmonary/Chest: Effort normal and breath sounds normal. No tachypnea and no bradypnea. No respiratory distress. She has no decreased breath sounds. She has no wheezes. She has no rhonchi. She has no rales. She exhibits no tenderness.   Abdominal: Abdomen is soft. Bowel sounds are normal. She exhibits no distension. There is no abdominal tenderness. There is no rebound.   Musculoskeletal:         General: Normal range of motion.      Cervical back: Normal range of motion and neck supple.      Right knee: No swelling, deformity or erythema. Normal range of motion. Tenderness present. Normal pulse.      Left knee: Normal.      Comments: Tenderness noted to R knee. Full 5/5 ROM noted. Patient ambulatory. CMS intact. All other adjacent joints otherwise normal.        Neurological: She is alert and oriented to person, place, and time. She has normal strength. No sensory deficit. GCS score is 15. GCS eye subscore is 4. GCS verbal subscore is 5. GCS motor subscore is 6.   Skin: Skin is warm and dry. Capillary refill takes less than 2 seconds. No erythema.   Psychiatric: She has a normal mood and affect. Thought content normal.         ED Course   Procedures  Labs Reviewed - No data to display       Imaging Results              X-Ray Knee Complete 4 Or More Views Right (Final result)  Result time 05/07/24 17:23:36      Final result by Jeffrey Carmen MD (05/07/24 17:23:36)                   Impression:      No acute findings.      Electronically signed by: Jeffrey Carmen  Date:    05/07/2024  Time:    17:23               Narrative:    EXAMINATION:  XR KNEE COMP 4 OR MORE VIEWS RIGHT    CLINICAL HISTORY:  Pain in unspecified knee    COMPARISON:  No priors    FINDINGS:  Four views of the right knee demonstrate no fracture, dislocation or sizable joint effusion.  Mild degenerative change with mild joint space narrowing medial compartment.                                        Medications - No data to display  Medical Decision Making  Patient awake, alert, has non-labored breathing, and follows commands appropriately. Arrived to ED due to R knee. Hx of chronic knee pain. Reports she used to see ortho services regarding pain and received joint injections. Afebrile. Denies injury/trauma.           Differential Diagnosis: Knee Pain, Joint Effusion, OA     Amount and/or Complexity of Data Reviewed  Radiology: ordered. Decision-making details documented in ED Course.     Details: XR- No acute findings. Informed patient of results.   Discussion of management or test interpretation with external provider(s): Discussed plan of care and interventions with patient. Agreed to and aware of plan of care. Comfortable being discharged home. Patient discharged home. Patient denies new or additional complaints; no further tests indicated at this time. Verbalized understanding of instructions. No emergent or apparent distress noted prior to discharge. To follow up with PCP in 1 week as needed. Strict ER return precautions given.       Risk  Prescription drug management.               ED Course as of 05/07/24 1742   Tue May 07, 2024   1736 X-Ray Knee Complete 4 Or More Views Right  No acute findings. [JA]   1737 Imaging unremarkable, patient ambulatory without assistance, and no obvious deformities noted. Patient has been seen by [facility]'s ortho services before, reports she has called to schedule an appointment. Will prescribe medication for pain and instructed patient to f/u with services.  [JA]      ED Course User Index  [JA] Nita Villavicencio, NP                           Clinical Impression:  Final diagnoses:  [M25.569] Knee pain          ED Disposition Condition    Discharge Stable          ED Prescriptions       Medication Sig Dispense Start Date End Date Auth. Provider    HYDROcodone-acetaminophen (NORCO) 7.5-325 mg per tablet Take 1 tablet by mouth every 6 (six) hours as needed for Pain. 12 tablet  5/7/2024 -- Nita Villavicencio, NP          Follow-up Information       Follow up With Specialties Details Why Contact Info    Shravan Nj, DO Internal Medicine Call in 1 week If symptoms worsen, As needed [address] [facility] [address] [phone]      [facility] - Emergency Dept Emergency Medicine Go to  If symptoms worsen, As needed [address] [facility] [address] [phone]    [facility] Orth[facility]ics Orthopedics Call  As needed, If symptoms worsen [address] [facility] [address] [phone]             Nita Villavicencio, NP  05/07/24 5063

## 2024-05-07 NOTE — DISCHARGE INSTRUCTIONS
Thanks for letting us take care of you today!  It is our goal to give you courteous care and to keep you comfortable and informed, if you have any questions before you leave I will be happy to try and answer them.    Here is some advice after your visit:      Your visit in the emergency department is NOT definitive care - please follow-up with your primary care doctor and/or specialist within 1 week.  Please return if you have any worsening in your condition or if you have any other concerns.    If you had radiology exams like an XRAY or CT in the emergency Department the interpreation on them may be preliminary - there may be less time sensitive findings on the reports please obtain these reports within 24 hours from the hospital or by using your out on your mobile phone to access records.  Bring these to your primary care doctor and/or specialist for further review of incidental findings.    If you were prescribed OPIATE PAIN MEDICATION - please understand of these medications can be addictive, you may fill less of the prescription was written for, you do not have to take the full prescription.  You may discard what you do not use.  Please seek help if you feel you are having problems with addiction.  Do not drive or operate heavy machinery if you are taking sedating medications.  Do not mix these medications with alcohol.

## 2024-07-03 ENCOUNTER — HOSPITAL ENCOUNTER (OUTPATIENT)
Dept: RADIOLOGY | Facility: HOSPITAL | Age: 54
Discharge: HOME | End: 2024-07-03
Attending: STUDENT IN AN ORGANIZED HEALTH CARE EDUCATION/TRAINING PROGRAM
Payer: MEDICAID

## 2024-07-03 ENCOUNTER — OFFICE VISIT (OUTPATIENT)
Dept: ORTHOPEDICS | Facility: CLINIC | Age: 54
End: 2024-07-03
Payer: MEDICAID

## 2024-07-03 VITALS
HEART RATE: 73 BPM | HEIGHT: 62 IN | DIASTOLIC BLOOD PRESSURE: 82 MMHG | SYSTOLIC BLOOD PRESSURE: 137 MMHG | BODY MASS INDEX: 53.92 KG/M2 | WEIGHT: 293 LBS

## 2024-07-03 DIAGNOSIS — M25.561 RIGHT KNEE PAIN, UNSPECIFIED CHRONICITY: ICD-10-CM

## 2024-07-03 DIAGNOSIS — M22.2X1 RIGHT PATELLOFEMORAL SYNDROME: ICD-10-CM

## 2024-07-03 DIAGNOSIS — M17.11 PRIMARY OSTEOARTHRITIS OF RIGHT KNEE: Primary | ICD-10-CM

## 2024-07-03 PROCEDURE — 99215 OFFICE O/P EST HI 40 MIN: CPT | Mod: PBBFAC,25

## 2024-07-03 PROCEDURE — 73564 X-RAY EXAM KNEE 4 OR MORE: CPT | Mod: TC,RT

## 2024-07-03 RX ORDER — FLUTICASONE PROPIONATE 220 UG/1
2 AEROSOL, METERED RESPIRATORY (INHALATION) 2 TIMES DAILY
COMMUNITY
Start: 2024-04-21

## 2024-07-03 RX ORDER — ALPRAZOLAM 2 MG/1
TABLET ORAL
COMMUNITY
Start: 2024-06-26

## 2024-07-03 RX ORDER — LIDOCAINE 50 MG/G
1 PATCH TOPICAL
COMMUNITY
Start: 2024-04-21

## 2024-07-03 RX ORDER — AMLODIPINE BESYLATE 5 MG/1
5 TABLET ORAL
COMMUNITY

## 2024-07-03 RX ORDER — METHOCARBAMOL 500 MG/1
500 TABLET, FILM COATED ORAL 4 TIMES DAILY PRN
COMMUNITY
Start: 2024-02-19

## 2024-07-03 RX ORDER — OSELTAMIVIR PHOSPHATE 75 MG/1
75 CAPSULE ORAL 2 TIMES DAILY
COMMUNITY
Start: 2024-02-07

## 2024-07-03 RX ORDER — METHYLPREDNISOLONE 4 MG/1
TABLET ORAL
COMMUNITY
Start: 2024-02-16

## 2024-07-03 RX ORDER — FERROUS SULFATE 325(65) MG
TABLET ORAL
COMMUNITY
Start: 2024-01-21

## 2024-07-03 RX ORDER — DICLOFENAC SODIUM 10 MG/G
2 GEL TOPICAL 4 TIMES DAILY
Qty: 100 G | Refills: 3 | Status: SHIPPED | OUTPATIENT
Start: 2024-07-03

## 2024-07-03 RX ORDER — SEMAGLUTIDE 0.68 MG/ML
0.5 INJECTION, SOLUTION SUBCUTANEOUS
COMMUNITY
Start: 2024-04-15

## 2024-07-03 NOTE — PROGRESS NOTES
"Subjective:    Patient ID: Dorita Moran is a 54 y.o. female  who presented to [facility] for follow up.      Chief Complaint: Pain of the Right Knee      History of Present Illness:  Dorita Moran who has a known history bilateral knee osteoarthritis presented today with right knee pain for over 2 years.  Pain is located anteriorly, rated as 10/10, described as burning, worse with walking and getting up after sitting down for a long time.  States that when she walks she feels a popping sensation in her knee which exacerbates her pain.  She does have instability and mechanical symptoms.  She denies any previous or recent injuries.  She has done physical therapy for both knees most recently last year.  She has also used a knee brace.  She does have lumbar spondylosis in his own gabapentin.  She denies pain radiating from her back to her knee.    Knee Review of Systems:  Swelling?  yes  Instability?  yes  Mechanical sx?  yes  <30 min AM stiffness? yes  Limited ROM? yes  Fever/Chills? no       Objective:      Physical Exam:    /82   Pulse 73   Ht 5' 2" (1.575 m)   Wt (!) 156.2 kg (344 lb 5.7 oz)   BMI 62.98 kg/m²     Ortho/SPM Exam    Appearance:  Normal gait/station  FWB  Alignment: Left: mild varus Right: mild varus   Soft tissue swelling: Left: no Right: no  Effusion: Left:  Negative Right: Negative  Erythema: Left no Right: no  Ecchymosis: Left: no Right: no  Atrophy: Left: no Right: no    Palpation:  Knee Tenderness: Left: None Right: Patellar tendon, Medial collateral ligament, and lateral collateral ligament    Range of motion:  Flexion (140): Left:  140 Right: 130  Extension (0): Left: 0 Right: 1    Strength:  Extension: Left 5/5  Pain: no     Right 5/5 Pain: yes  Flexion: Left 5/5 Pain: no Right   5/5 Pain: yes    Special Tests:  Ballotable Effusion:Left: Negative Right: Negative   Fluid Wave: Left: Negative Right: Negative   Crepitus: Left: " Negative Right: Positive   Patellar grind test: Left: Negative  Right: Negative  Apprehension test: Left: Negative Right: Negative   Varus: @ 0, Left Negative Right: Negative.  @ 30, Left Negative  Right Negative   Valgus: @ 0, Left Negative Right: Negative.  @ 30, Left Negative  Right Negative  Lachman: Left: Negative Right: Negative   Ant Drawer: Left: Negative Right: Negative   Posterior Drawer: Left: Negative Right: Negative    Sosa: Left: Negative Right: Positive   Thessaly's: Left: Not performed Right: Positive     General appearance: NAD  Peripheral pulses: normal bilaterally   Reflexes: Left: normal Right normal   Sensation: normal    Labs:  Last A1c: The patient doesn't have any registry metric data available     Imaging:   Previous images reviewed.  X-rays ordered and performed today: yes  # of views: 4 Laterality: right  My Interpretation:  Mild medial joint space narrowing, patellofemoral joint space narrowing with patellar osteophytes.      Assessment:        Encounter Diagnoses   Code Name Primary?    M17.11 Primary osteoarthritis of right knee Yes    M22.2X1 Right patellofemoral syndrome         Plan:       Dx:  Right patellofemoral syndrome, right Knee Osteoarthritis.  Chronic in moderate exacerbation.  Treatment Plan: Discussed with patient diagnosis, prognosis, and treatment recommendations. Education provided.    Recommend conservative treatment to include: avoidance of aggravating activity, significant modification of daily activities, hot/cold therapies, topical and oral medications, braces, HEP/PT/OT, and injections.   Recommend conservative management, patient is amenable with the plan.    Start patellar stabilizing knee brace.    Start PT with a focus on quad, hamstring glute and core strengthening.  Start topical diclofenac.    Continue aggressive weight loss strategies.    Discussed p.o. NSAIDs however patient states she has had a gastric bypass, we will defer anti-inflammatory  medication to PCP.  Imaging: radiological studies ordered and independently reviewed; discussed with patient; pending radiologist interpretation.   Weight Management: is paramount. Recommend to discuss with PCP about medication and bariatric surgery options for weight loss if your BMI is >35 and applicable. A BMI of <24.9 may provide further relief..   Procedure: Discussed CSI/VSI as treatment options; patient declines today, we will consider in the future..  Activity: Activity as tolerated; HEP to include aerobic conditioning and strength training with non-painful activity. ROM/STG exercises. Proper footware; assistive devises to avoid limping.   Therapy: Physical Therapy  Medication: START Voltaren Gel 1% as prescribed to affected area. Please see your primary care physician for further refills.  RTC: 3 months.         Lico Lozada MD.  Note dictated using MModal.

## 2024-07-08 ENCOUNTER — TELEPHONE (OUTPATIENT)
Dept: ORTHOPEDICS | Facility: CLINIC | Age: 54
End: 2024-07-08
Payer: MEDICAID

## 2024-07-08 NOTE — TELEPHONE ENCOUNTER
Patient called satasting that [facility] will not fill her Voltaren gel because it is not covered.  IS there something else that can be sent for her pain ?

## 2024-07-09 NOTE — TELEPHONE ENCOUNTER
Called patient to let her know that Dr Lozada does not have any other drugs that would help and that she would have to buy the over the counter.  She verbally understood

## 2024-11-11 ENCOUNTER — OFFICE VISIT (OUTPATIENT)
Dept: ORTHOPEDICS | Facility: CLINIC | Age: 54
End: 2024-11-11
Payer: MEDICAID

## 2024-11-11 VITALS
HEART RATE: 62 BPM | OXYGEN SATURATION: 99 % | SYSTOLIC BLOOD PRESSURE: 144 MMHG | TEMPERATURE: 99 F | RESPIRATION RATE: 19 BRPM | BODY MASS INDEX: 53.92 KG/M2 | HEIGHT: 62 IN | DIASTOLIC BLOOD PRESSURE: 79 MMHG | WEIGHT: 293 LBS

## 2024-11-11 DIAGNOSIS — M17.11 PRIMARY OSTEOARTHRITIS OF RIGHT KNEE: Primary | ICD-10-CM

## 2024-11-11 PROCEDURE — 99215 OFFICE O/P EST HI 40 MIN: CPT | Mod: PBBFAC | Performed by: STUDENT IN AN ORGANIZED HEALTH CARE EDUCATION/TRAINING PROGRAM

## 2024-11-11 RX ORDER — MELOXICAM 15 MG/1
15 TABLET ORAL DAILY
Qty: 15 TABLET | Refills: 0 | Status: SHIPPED | OUTPATIENT
Start: 2024-11-11

## 2025-01-17 ENCOUNTER — HOSPITAL ENCOUNTER (EMERGENCY)
Facility: HOSPITAL | Age: 55
Discharge: HOME | End: 2025-01-17
Attending: EMERGENCY MEDICINE
Payer: MEDICAID

## 2025-01-17 VITALS
DIASTOLIC BLOOD PRESSURE: 110 MMHG | TEMPERATURE: 98 F | HEIGHT: 62 IN | BODY MASS INDEX: 53.92 KG/M2 | WEIGHT: 293 LBS | OXYGEN SATURATION: 100 % | SYSTOLIC BLOOD PRESSURE: 146 MMHG | RESPIRATION RATE: 20 BRPM | HEART RATE: 74 BPM

## 2025-01-17 DIAGNOSIS — M54.16 LUMBAR RADICULOPATHY: ICD-10-CM

## 2025-01-17 DIAGNOSIS — M54.31 SCIATICA OF RIGHT SIDE: Primary | ICD-10-CM

## 2025-01-17 PROCEDURE — 96372 THER/PROPH/DIAG INJ SC/IM: CPT | Performed by: EMERGENCY MEDICINE

## 2025-01-17 PROCEDURE — 63600175 PHARM REV CODE 636 W HCPCS: Performed by: EMERGENCY MEDICINE

## 2025-01-17 PROCEDURE — 99284 EMERGENCY DEPT VISIT MOD MDM: CPT | Mod: 25

## 2025-01-17 PROCEDURE — 25000003 PHARM REV CODE 250: Performed by: EMERGENCY MEDICINE

## 2025-01-17 RX ORDER — CYCLOBENZAPRINE HCL 10 MG
10 TABLET ORAL
Status: COMPLETED | OUTPATIENT
Start: 2025-01-17 | End: 2025-01-17

## 2025-01-17 RX ORDER — TIZANIDINE 4 MG/1
4 TABLET ORAL EVERY 6 HOURS PRN
Qty: 24 TABLET | Refills: 0 | Status: SHIPPED | OUTPATIENT
Start: 2025-01-17 | End: 2025-01-27

## 2025-01-17 RX ORDER — PREDNISONE 10 MG/1
20 TABLET ORAL DAILY
Qty: 10 TABLET | Refills: 0 | Status: SHIPPED | OUTPATIENT
Start: 2025-01-17 | End: 2025-01-22

## 2025-01-17 RX ORDER — DEXAMETHASONE SODIUM PHOSPHATE 4 MG/ML
8 INJECTION, SOLUTION INTRA-ARTICULAR; INTRALESIONAL; INTRAMUSCULAR; INTRAVENOUS; SOFT TISSUE
Status: COMPLETED | OUTPATIENT
Start: 2025-01-17 | End: 2025-01-17

## 2025-01-17 RX ADMIN — DEXAMETHASONE SODIUM PHOSPHATE 8 MG: 4 INJECTION, SOLUTION INTRA-ARTICULAR; INTRALESIONAL; INTRAMUSCULAR; INTRAVENOUS; SOFT TISSUE at 11:01

## 2025-01-17 RX ADMIN — CYCLOBENZAPRINE 10 MG: 10 TABLET, FILM COATED ORAL at 11:01

## 2025-01-17 NOTE — Clinical Note
"Dorita"SHIRA Moran was seen and treated in our emergency department on 1/17/2025.  She may return to work on 01/20/2025.       If you have any questions or concerns, please don't hesitate to call.      Raissa Dominguez, DO"

## 2025-01-17 NOTE — ED PROVIDER NOTES
Encounter Date: 2025       History     Chief Complaint   Patient presents with    Back Pain     Reports of chronic back pain/sciatica x 10 years, worse today. Denies loss of bladder/bowel continence. Denies taking anything for pain today.     HPI  54-year-old black female with a history of sciatica, arthritis, and back pain.  She says that she gets pain in her knee when she walks often sometimes it swells up.  She also complains of swelling to her legs after ambulation and during the day.  Today she comes in with pain to her right flank area they got radiates from the back of her leg into her butt.  She denies saddle paresthesia or numbness.  She denies recent trauma.  She is requesting pain medication.  We discussed what her exercise activity level is.  We also discussed discussed back stretches.  Review of patient's allergies indicates:   Allergen Reactions    Tramadol Hives and Itching    Naproxen Hives     Past Medical History:   Diagnosis Date    Anemia, unspecified     Anxiety     Asthma     Hypertension     Insomnia     Sleep apnea, unspecified     Unspecified osteoarthritis, unspecified site      Past Surgical History:   Procedure Laterality Date    APPENDECTOMY      BARIATRIC SURGERY      x2    CARPAL TUNNEL RELEASE Left 2023    Procedure: RELEASE, CARPAL TUNNEL;  Surgeon: Jerry Rodriguez MD;  Location: [facility];  Service: Plastics;  Laterality: Left;     SECTION      INTRAUTERINE DEVICE INSERTION      LUMPECTOMY,BREAST,WITH RADIOACTIVE SEED LOCALIZATION Right 2023    Procedure: LUMPECTOMY,BREAST,WITH RADIOACTIVE SEED LOCALIZATION;  Surgeon: Joyce Rascon MD;  Location: [facility];  Service: General;  Laterality: Right;    ULNAR TUNNEL RELEASE Left 2023    Procedure: RELEASE, CUBITAL TUNNEL;  Surgeon: Jerry Rodriguez MD;  Location: [facility];  Service: Plastics;  Laterality: Left;     Family History   Problem Relation Name Age of Onset    Asthma Son Mario Moran      Hypertension Son Mario Moran     Mental illness Son Mario Moran     Depression Son Mario Moran      Social History     Tobacco Use    Smoking status: Never    Smokeless tobacco: Never   Substance Use Topics    Alcohol use: Never    Drug use: Never     Review of Systems   Constitutional:  Positive for activity change.   HENT: Negative.     Respiratory: Negative.     Cardiovascular: Negative.    Gastrointestinal: Negative.    Genitourinary: Negative.    Musculoskeletal:  Positive for arthralgias, back pain and myalgias.   Neurological: Negative.    Psychiatric/Behavioral: Negative.         Physical Exam     Initial Vitals [01/17/25 0929]   BP Pulse Resp Temp SpO2   (!) 146/110 74 20 98.1 °F (36.7 °C) 100 %      MAP       --         Physical Exam    Nursing note and vitals reviewed.  Constitutional: She appears well-developed and well-nourished.   Morbidly obese with BMI greater than 30   Neck:   Normal range of motion.  Pulmonary/Chest: Breath sounds normal.   Abdominal: Abdomen is soft.   Soft, protuberant, nontender   Musculoskeletal:      Cervical back: Normal range of motion.      Comments: No midline tenderness to her LS spine.  Paratubal soreness on the right this is in his dorsi region although no appreciated muscle spasm noted.  There is no evidence of scoliosis.  She has a steady gait.  No weakness in her arms or legs appreciated.  No clonus     Neurological: She is alert and oriented to person, place, and time. She has normal strength.   Skin: Skin is warm.   Psychiatric: She has a normal mood and affect.         ED Course   Procedures  Labs Reviewed - No data to display       Imaging Results    None          Medications   dexAMETHasone injection 8 mg (8 mg Intramuscular Given 1/17/25 1122)   cyclobenzaprine tablet 10 mg (10 mg Oral Given 1/17/25 1122)     Medical Decision Making  54-year-old black female here with back pain.  This pain has been recurrent but chronic in nature.  There has been no new  injury.  The pain sounds radicular.  She is not currently stretching or exercising.  We discussed wearing good support shoes as well as activity levels that may help along with stretching of her hamstrings, quads, and low back.  She has had physical therapy for this type of pain before and I recommended to go back to it.    Risk  Prescription drug management.               ED Course as of 01/17/25 1136   Fri Jan 17, 2025   1100 Patient with history of sciatica etc. the pain got much worse over the last few days.  Pain goes from her right flank into her low back.  She also has arthritis in her knee.  No recent fall. [LG]      ED Course User Index  [LG] Raissa Dominguez DO     Differential would include sciatic nerve inflammation versus lumbar radiculopathy versus paravertebral muscle spasm  Medication list reviewed.  I also reviewed her allergies.  Comorbidity and social determinants of health have been reviewed and are on the chart.  She is here alone.                    Sciatic   Clinical Impression:  Lumbar radiculopathy, nerve inflammation  Final diagnoses:  [M54.31] Sciatica of right side (Primary)  [M54.16] Lumbar radiculopathy          ED Disposition Condition    Discharge Stable          ED Prescriptions       Medication Sig Dispense Start Date End Date Auth. Provider    predniSONE (DELTASONE) 10 MG tablet Take 2 tablets (20 mg total) by mouth once daily. for 5 days 10 tablet 1/17/2025 1/22/2025 Raissa Dominguez DO    tiZANidine (ZANAFLEX) 4 MG tablet Take 1 tablet (4 mg total) by mouth every 6 (six) hours as needed (For back pain). 24 tablet 1/17/2025 1/27/2025 Raissa Dominguez DO          Follow-up Information    None          Raissa Dominguez DO  01/17/25 113

## 2025-01-24 NOTE — PROGRESS NOTES
"Subjective:    Patient ID: Dorita Moran is a 54 y.o. female  who presented to [facility] for follow up.    Chief Complaint: Follow-up of the Right Knee ( 3M F/U R KNEE)    History of Present Illness:  Dorita Moran who has a known history bilateral knee osteoarthritis presented today with right knee pain for over 2 years.  Pain is located anteriorly, rated as 810, She does have instability and mechanical symptoms.  She denies any previous or recent injuries.  She has done physical therapy for both knees which has helped some. Otc analgesics are helping somewhat.     Objective:      Physical Exam:  BP (!) 144/79   Pulse 62   Temp 98.7 °F (37.1 °C) (Oral)   Resp 19   Ht 5' 2" (1.575 m)   Wt (!) 148 kg (326 lb 4.5 oz)   SpO2 99%   BMI 59.68 kg/m²     Appearance:  Normal gait/station  FWB  Alignment: Left: mild varus Right: mild varus   Soft tissue swelling: Left: no Right: no  Effusion: Left:  Negative Right: Negative  Erythema: Left no Right: no  Ecchymosis: Left: no Right: no  Atrophy: Left: no Right: no    Palpation:  Knee Tenderness: Left: None Right: Patellar tendon, Medial collateral ligament, and lateral collateral ligament    Range of motion:  Flexion (140): Left:  140 Right: 130  Extension (0): Left: 0 Right: 1    Strength:  Extension: Left 5/5  Pain: no     Right 5/5 Pain: yes  Flexion: Left 5/5 Pain: no Right   5/5 Pain: yes    Special Tests:  Ballotable Effusion:Left: Negative Right: Negative   Fluid Wave: Left: Negative Right: Negative   Crepitus: Left: Negative Right: Positive   Patellar grind test: Left: Negative  Right: Negative  Apprehension test: Left: Negative Right: Negative   Varus: @ 0, Left Negative Right: Negative.  @ 30, Left Negative  Right Negative   Valgus: @ 0, Left Negative Right: Negative.  @ 30, Left Negative  Right Negative  Lachman: Left: Negative Right: Negative   Ant Drawer: Left: Negative Right: Negative   Posterior Drawer: " Left: Negative Right: Negative    Sosa: Left: Negative Right: Positive   Thessaly's: Left: Not performed Right: Positive     General appearance: NAD  Peripheral pulses: normal bilaterally   Reflexes: Left: normal Right normal   Sensation: normal    Labs:  Last A1c: 6.1     Imaging:   Previous images reviewed.  X-rays ordered and performed today: no  Assessment:        No diagnosis found.       Plan:     Dx: right Knee Osteoarthritis  Treatment Plan:   - Discussed with patient diagnosis, prognosis, and treatment recommendations. Education provided.    - Recommend conservative treatment to include: avoidance of aggravating activity, significant modification of daily activities, hot/cold therapies, topical and oral medications, braces, HEP/PT/OT, and injections.   - Continue aggressive weight loss strategies.    - Discussed p.o. NSAIDs however patient states she has had a gastric bypass, we will defer anti-inflammatory medication to PCP.  Imaging: radiological studies ordered and independently reviewed; discussed with patient; pending radiologist interpretation.   Weight Management: is paramount. Recommend to discuss with PCP about medication and bariatric surgery options for weight loss if your BMI is >35 and applicable. A BMI of <24.9 may provide further relief..   Procedure: Discussed CSI/VSI as treatment options; patient declines today, we will consider in the future..  Activity: Activity as tolerated; HEP to include aerobic conditioning and strength training with non-painful activity. ROM/STG exercises. Proper footware; assistive devises to avoid limping.   Therapy: Physical Therapy  RTC: 3 months.

## 2025-04-01 ENCOUNTER — HOSPITAL ENCOUNTER (OUTPATIENT)
Facility: HOSPITAL | Age: 55
Discharge: HOME | End: 2025-04-01
Attending: INTERNAL MEDICINE | Admitting: INTERNAL MEDICINE
Payer: MEDICAID

## 2025-04-01 PROCEDURE — 91110 GI TRC IMG INTRAL ESOPH-ILE: CPT | Mod: TC | Performed by: INTERNAL MEDICINE

## 2025-04-01 PROCEDURE — 27201423 OPTIME MED/SURG SUP & DEVICES STERILE SUPPLY: Performed by: INTERNAL MEDICINE

## 2025-04-16 ENCOUNTER — HOSPITAL ENCOUNTER (EMERGENCY)
Facility: HOSPITAL | Age: 55
Discharge: HOME | End: 2025-04-16
Attending: EMERGENCY MEDICINE
Payer: MEDICAID

## 2025-04-16 VITALS
WEIGHT: 293 LBS | HEIGHT: 62 IN | HEART RATE: 61 BPM | TEMPERATURE: 99 F | BODY MASS INDEX: 53.92 KG/M2 | OXYGEN SATURATION: 99 % | RESPIRATION RATE: 18 BRPM | SYSTOLIC BLOOD PRESSURE: 146 MMHG | DIASTOLIC BLOOD PRESSURE: 80 MMHG

## 2025-04-16 DIAGNOSIS — M25.522 LEFT ELBOW PAIN: ICD-10-CM

## 2025-04-16 DIAGNOSIS — G56.22 ULNAR NEUROPATHY OF LEFT UPPER EXTREMITY: Primary | ICD-10-CM

## 2025-04-16 PROCEDURE — 25000003 PHARM REV CODE 250: Performed by: PHYSICIAN ASSISTANT

## 2025-04-16 PROCEDURE — 99284 EMERGENCY DEPT VISIT MOD MDM: CPT | Mod: 25

## 2025-04-16 RX ORDER — METHOCARBAMOL 750 MG/1
750 TABLET, FILM COATED ORAL 3 TIMES DAILY
Qty: 21 TABLET | Refills: 0 | Status: SHIPPED | OUTPATIENT
Start: 2025-04-16 | End: 2025-04-23

## 2025-04-16 RX ORDER — HYDROCODONE BITARTRATE AND ACETAMINOPHEN 5; 325 MG/1; MG/1
1 TABLET ORAL EVERY 6 HOURS PRN
Qty: 12 TABLET | Refills: 0 | Status: SHIPPED | OUTPATIENT
Start: 2025-04-16

## 2025-04-16 RX ORDER — HYDROCODONE BITARTRATE AND ACETAMINOPHEN 10; 325 MG/1; MG/1
1 TABLET ORAL
Refills: 0 | Status: COMPLETED | OUTPATIENT
Start: 2025-04-16 | End: 2025-04-16

## 2025-04-16 RX ADMIN — HYDROCODONE BITARTRATE AND ACETAMINOPHEN 1 TABLET: 10; 325 TABLET ORAL at 01:04

## 2025-04-16 NOTE — ED PROVIDER NOTES
Encounter Date: 2025       History     Chief Complaint   Patient presents with    Arm Pain     Pt arrives via POV. Pt endorses L elbow pain X a few days. Pt denies falls/traumatic events. Pt states she does lift heavy objects often. Strength equal bilaterally.      See MDM for details.      The history is provided by the patient. No  was used.     Review of patient's allergies indicates:   Allergen Reactions    Tramadol Hives and Itching    Naproxen Hives     Past Medical History:   Diagnosis Date    Anemia, unspecified     Anxiety     Asthma     Hypertension     Insomnia     Sleep apnea, unspecified     Unspecified osteoarthritis, unspecified site      Past Surgical History:   Procedure Laterality Date    APPENDECTOMY      BARIATRIC SURGERY      x2    CARPAL TUNNEL RELEASE Left 2023    Procedure: RELEASE, CARPAL TUNNEL;  Surgeon: Jerry Rodriguez MD;  Location: [facility];  Service: Plastics;  Laterality: Left;     SECTION      INTRALUMINAL GASTROINTESTINAL TRACT IMAGING VIA CAPSULE N/A 2025    Procedure: M2A;  Surgeon: Earl Madrid MD;  Location: [facility];  Service: Gastroenterology;  Laterality: N/A;    INTRAUTERINE DEVICE INSERTION      LUMPECTOMY,BREAST,WITH RADIOACTIVE SEED LOCALIZATION Right 2023    Procedure: LUMPECTOMY,BREAST,WITH RADIOACTIVE SEED LOCALIZATION;  Surgeon: Joyce Rascon MD;  Location: [facility] OR;  Service: General;  Laterality: Right;    ULNAR TUNNEL RELEASE Left 2023    Procedure: RELEASE, CUBITAL TUNNEL;  Surgeon: Jerry Rodriguez MD;  Location: [facility];  Service: Plastics;  Laterality: Left;     Family History   Problem Relation Name Age of Onset    Asthma Son Mario Moran     Hypertension Son Mario Moran     Mental illness Son Mario Moran     Depression Son Mario Moran      Social History[1]  Review of Systems   Constitutional: Negative.    HENT: Negative.     Eyes: Negative.    Respiratory: Negative.      Cardiovascular: Negative.    Gastrointestinal: Negative.    Genitourinary: Negative.    Musculoskeletal:  Positive for arthralgias.   Neurological: Negative.    All other systems reviewed and are negative.      Physical Exam     Initial Vitals [04/16/25 1208]   BP Pulse Resp Temp SpO2   (!) 152/90 82 18 98.7 °F (37.1 °C) 98 %      MAP       --         Physical Exam    Nursing note and vitals reviewed.  Constitutional: She appears well-developed and well-nourished. She is not diaphoretic. No distress.   Morbid obesity   HENT:   Head: Atraumatic.   Eyes: Lids are normal.   Cardiovascular:  Normal rate.           Pulmonary/Chest: Effort normal. No respiratory distress.   Abdominal: Abdomen is flat.   Musculoskeletal:        Arms:       Comments: Good  strength bilaterally. Able to hold her arm up. NVI distal to the area.      Neurological: She is alert and oriented to person, place, and time. She has normal strength. She is not disoriented. GCS eye subscore is 4. GCS verbal subscore is 5. GCS motor subscore is 6.   Skin: Skin is warm and intact.   Psychiatric: She has a normal mood and affect. Her speech is normal and behavior is normal. Judgment and thought content normal. Cognition and memory are normal.         ED Course   Procedures  Labs Reviewed - No data to display       Imaging Results              X-Ray Elbow Complete Left (Final result)  Result time 04/16/25 12:57:20      Final result by Sunni Boucher MD (04/16/25 12:57:20)                   Impression:      No acute bony abnormality.      Electronically signed by: Sunni Boucher  Date:    04/16/2025  Time:    12:57               Narrative:    EXAMINATION:  XR ELBOW COMPLETE 3 VIEW LEFT    CLINICAL HISTORY:  Pain in left elbow    COMPARISON:  None.    FINDINGS:  There is no acute fracture or malalignment.  There is no elbow joint effusion.                                       Medications   HYDROcodone-acetaminophen  mg per tablet 1  tablet (1 tablet Oral Given 4/16/25 1338)     Medical Decision Making  55yoAAF w/left elbow pain that radiates into the top of the hand. Moving some stuff. No neck pain. No radiating symptoms to the neck. No weakness. Pain worsening. No known injury.     Problems Addressed:  Left elbow pain: acute illness or injury     Details: Differential diagnosis included but not limited to:  Ulnar neuropathy, radicular neuropathy, radial nerve palsy, elbow fracture, other etiologies     Patient has left elbow pain that radiates just to the distal left humerus into the top of the left hand.  Decreased sensation on that side.  Patient likely has a version of radial nerve and ulnar nerve neuropathy.  She does not have any neck pain or radiating symptoms into the neck.  No nausea or vomiting. Good  strength bilaterally.  She will follow up with her regular doctor.  Continue gabapentin and pain medicine at home. All questions asked and answered at the time of the visit. Strict ER precautions given. Patient and family members agreeable to plan.           Ulnar neuropathy of left upper extremity: acute illness or injury    Amount and/or Complexity of Data Reviewed  Radiology: ordered.    Risk  Prescription drug management.                                      Clinical Impression:  Final diagnoses:  [M25.522] Left elbow pain  [G56.22] Ulnar neuropathy of left upper extremity (Primary)          ED Disposition Condition    Discharge Stable          ED Prescriptions       Medication Sig Dispense Start Date End Date Auth. Provider    HYDROcodone-acetaminophen (NORCO) 5-325 mg per tablet Take 1 tablet by mouth every 6 (six) hours as needed for Pain. 12 tablet 4/16/2025 -- Jaqueline Valencia PA    methocarbamoL (ROBAXIN) 750 MG Tab Take 1 tablet (750 mg total) by mouth 3 (three) times daily. for 7 days 21 tablet 4/16/2025 4/23/2025 Jaqueline Valencia PA          Follow-up Information       Follow up With Specialties Details Why  Contact Info Additional Information    Shravan Nj, DO Internal Medicine Call today  [address] [facility] [address] [phone]       [facility] - Emergency Dept Emergency Medicine  As needed, If symptoms worsen [address] [facility] [id number] [phone]      [facility] Call   [address], [address]  [facility] [address] [phone] [address]          This note was typed partially using voice recognition software.  Please be reminded that not all corrections/addendums to grammar may have been made prior to closing of this chart.         [1]   Social History  Tobacco Use    Smoking status: Never    Smokeless tobacco: Never   Substance Use Topics    Alcohol use: Never    Drug use: Never        Jaqueline Valencia PA  04/16/25 1417

## 2025-04-16 NOTE — FIRST PROVIDER EVALUATION
Medical screening examination initiated.  I have conducted a focused provider triage encounter, findings are as follows:    Brief history of present illness:  55yoAAF w/left elbow pain that radiates into the top of the hand. Moving some stuff. No neck pain. No radiating symptoms to the neck. No weakness. Pain worsening.    There were no vitals filed for this visit.    Pertinent physical exam:  NAD. Guarding left elbow.     Brief workup plan:  imaging.     Preliminary workup initiated; this workup will be continued and followed by the physician or advanced practice provider that is assigned to the patient when roomed.

## 2025-04-29 ENCOUNTER — HOSPITAL ENCOUNTER (OUTPATIENT)
Dept: RADIOLOGY | Facility: HOSPITAL | Age: 55
Discharge: HOME | End: 2025-04-29
Payer: MEDICAID

## 2025-04-29 DIAGNOSIS — T18.9XXA FOREIGN BODY INGESTION: ICD-10-CM

## 2025-04-29 PROCEDURE — 74018 RADEX ABDOMEN 1 VIEW: CPT | Mod: TC

## 2025-05-13 ENCOUNTER — TELEPHONE (OUTPATIENT)
Dept: GASTROENTEROLOGY | Facility: CLINIC | Age: 55
End: 2025-05-13
Payer: MEDICAID

## 2025-05-13 NOTE — TELEPHONE ENCOUNTER
----- Message from Allyn sent at 5/13/2025  3:50 PM CDT -----  Hello,Patient has a referral/records scanned in media mgr on 4/29/25. Doris with Dr. Romero office is calling to follow up. Doris can be reached at [phone] option 2.Thanks

## 2025-05-20 ENCOUNTER — OFFICE VISIT (OUTPATIENT)
Dept: ORTHOPEDICS | Facility: CLINIC | Age: 55
End: 2025-05-20
Payer: MEDICAID

## 2025-05-20 ENCOUNTER — HOSPITAL ENCOUNTER (OUTPATIENT)
Dept: RADIOLOGY | Facility: HOSPITAL | Age: 55
Discharge: HOME | End: 2025-05-20
Attending: SURGERY
Payer: MEDICAID

## 2025-05-20 VITALS
WEIGHT: 293 LBS | DIASTOLIC BLOOD PRESSURE: 73 MMHG | HEART RATE: 64 BPM | RESPIRATION RATE: 20 BRPM | TEMPERATURE: 98 F | SYSTOLIC BLOOD PRESSURE: 122 MMHG | OXYGEN SATURATION: 100 % | HEIGHT: 62 IN | BODY MASS INDEX: 53.92 KG/M2

## 2025-05-20 DIAGNOSIS — M79.2 NERVE PAIN: ICD-10-CM

## 2025-05-20 DIAGNOSIS — G56.22 LESION OF LEFT ULNAR NERVE: Primary | ICD-10-CM

## 2025-05-20 DIAGNOSIS — M25.522 LEFT ELBOW PAIN: ICD-10-CM

## 2025-05-20 PROCEDURE — 99215 OFFICE O/P EST HI 40 MIN: CPT | Mod: PBBFAC,25 | Performed by: SURGERY

## 2025-05-20 PROCEDURE — 73080 X-RAY EXAM OF ELBOW: CPT | Mod: TC,LT

## 2025-05-20 RX ORDER — METHYLPREDNISOLONE 4 MG/1
TABLET ORAL
Qty: 21 EACH | Refills: 0 | Status: SHIPPED | OUTPATIENT
Start: 2025-05-20 | End: 2025-06-10

## 2025-05-20 RX ORDER — LIDOCAINE 50 MG/G
1 PATCH TOPICAL DAILY
Qty: 30 PATCH | Refills: 2 | Status: SHIPPED | OUTPATIENT
Start: 2025-05-20

## 2025-05-20 NOTE — PROGRESS NOTES
"Subjective:    Patient ID: Dorita Moran is a right handed 55 y.o. female  who presented to [facility] for new visit..      Chief Complaint: Pain of the Left Elbow      History of Present Illness:    Dorita Moran who has a history of ?possible right ulnar nerve transposition (patient reported), and left carpel tunnel release (4/6/23, per EMR) presented today with a few weeks of left elbow / wrist / finger numbness, and weakness. Denies any injuries or trauma. She feels numbness radiating from her left small finger up into her left elbow, and some weakness in her left hand, with difficulty gripping objects and using her phone. She was seen in the ER 4/16/25 for this pain, and was prescribed Norco, Robaxin, and advised to continue with gabapentin (800mg TID). She reports no relief with the medication. She was referred to this clinic for further evaluation and possible management. She used to work in the kitchen and lift heavy objects.     Hand Review of Systems:  Swelling?  no  Instability?  no  Clicking?  no  Limited ROM? no  Fever/Chills? no  Subluxation? no  Dislocation? no  Numbness/Tingling? yes  Weakness? yes       Objective:      Physical Exam:    /73   Pulse 64   Temp 97.9 °F (36.6 °C) (Oral)   Resp 20   Ht 5' 2" (1.575 m)   Wt (!) 147 kg (324 lb 1.2 oz)   SpO2 100%   BMI 59.27 kg/m²     Ortho/SPM Exam    Appearance:  Soft tissue swelling: Left: no Right: no  Effusion: Left:  Negative Right: Negative  Erythema: Left no Right: no  Ecchymosis: Left: no Right: no  Atrophy: Left: no Right: no  L-shaped surgical scar at left medial elbow    Palpation:  Hand/wrist Tenderness: Left: none  Right: none    Range of motion:  Flexion (0-80): Left:  80 Right: 80  Extension (0-70): Left:  70 Right: 70  Ulnar deviation (0-30): 30 Right: 30  Radial deviation (0-20): 20 Right: 20  Supination (0-90): Left: 90 Right: 90  Pronation (0-90): Left: 90 Right: 90  Able " to make a power fist and claw hand: on Both hand(s)  Distal palmar crease-finger tip distance: 0 on Both hand(s)    Strength:  Abductor Pollicis Brevis: Left: 5/5 Right: 5/5   Finger abduction: Left: 4/5 Right 5/5   Flexion: Left: 5/5 Pain: no Right: 5/5 Pain: no  Extension: Left: 5/5 Pain: no Right: 5/5 Pain: no  Supination: Left: 5/5 Pain: no Right: 5/5 Pain: no  Pronation: Left: 5/5 Pain: no Right: 5/5 Pain: no  Ulnar deviation: Left: 5/5 Pain: no Right: 5/5 Pain: no  Radial deviation: Left: 5/5 Pain: no Right: 5/5 Pain: no    Special Tests:  Durkans Test (Carpal Compression test): Left: Negative  Right: Negative  Tinels (carpel tunnel):  Left: Negative  Right: Negative    Tinels (cubital tunnel):  Left: Positive  Right: Negative    Phalens: Left: Negative  Right: Negative    Coretta Litler test:  Left: Negative  Right: Negative    UCL laxity: Left: Not performed  Right: Not performed  FDP test: Left: Negative  Right: Negative  FDS test: Left: Negative  Right: Negative  Reverse Phalens: Left: Not performed Right: Not performed  Finkelstein's Test: Left: Negative Right: Negative    Froments: Left: Not performed Right: Not performed  Shuck Test: Left: Not performed Right: Not performed    AIN/PIN/Ulnar nerve: Mildly diminished sensation in left ulnar distribution.    Neurovascular Exam  General appearance: NAD  Peripheral pulses: normal bilaterally   Reflexes: Left: Not performed Right: Not performed   Sensation: diminished    Labs:  Last A1c: 6.1     Imaging:   Previous images reviewed.  X-rays ordered and performed today: yes  # of views: 3 Laterality: left  My Interpretation: No acute osseous abnormalities.      Bedside limited ultrasound (left medial elbow / cubital tunnel / ulnar nerve) 5/20/2025  - The ulnar nerve was visualized in the left medial elbow, and was not located in the left cubital tunnel, but medial to the left medial epicondyle. Consistent with possible prior ulnar nerve  transposition.    Assessment:        Encounter Diagnoses   Code Name Primary?    G56.22 Lesion of left ulnar nerve Yes    M79.2 Nerve pain         Plan:           Orders Placed This Encounter   Procedures    X-Ray Elbow Complete Left     Standing Status:   Future     Number of Occurrences:   1     Expected Date:   5/20/2025     Expiration Date:   5/20/2026     May the Radiologist modify the order per protocol to meet the clinical needs of the patient?:   Yes     Release to patient:   Immediate    Ambulatory referral/consult to Neurology     Referral Priority:   Routine     Referral Type:   Consultation     Referral Reason:   Specialty Services Required     Referred to Provider:   Christian Brasher MD     Requested Specialty:   Neurology     Number of Visits Requested:   1    [facility] ULS US Extremity Non Vascular Limited Left     Left cubital tunnel / ulnar nerve     Release to patient:   Immediate     Medications Ordered This Encounter   Medications    LIDOcaine (LIDODERM) 5 %     Sig: Place 1 patch onto the skin once daily. Remove & Discard patch within 12 hours or as directed by MD     Dispense:  30 patch     Refill:  2    methylPREDNISolone (MEDROL DOSEPACK) 4 mg tablet     Sig: use as directed     Dispense:  21 each     Refill:  0       MDM: Prior external referring provider notes reviewed. Prior external referring provider studies reviewed.   Patient reports history of carpel tunnel surgery and ulnar nerve transposition (R) surgery.  On exam, she does not have a surgical scar in the right elbow, and there is a L-shaped scar in the left elbow. Also, on ultrasound her left ulnar nerve was not in her left cubital tunnel. Patient reports a variety of hand / wrist weakness in the left hand, and ulnar nerve distribution like numbness following no injury. I suspect left ulnar nerve impingement, but also at the same time cannot rule out other peripheral nerve impingements at this time. The complexity of this case is  increased by likely prior left ulnar nerve transposition. Will refer to EMG/NCS to delineate nerve impingement (type and location) for future intervention planning (such as hydrodissection under ultrasound guidance).  Dx: left ulnar nerve impingement, acute moderate exacerbation; nerve pain (L ulnar nerve)  Treatment Plan: Discussed with patient diagnosis and treatment recommendations.   Natural history and expected course discussed. Questions answered.  Educational material distributed.  Reduction in offending activity.  Gentle ROM exercises.  Rest, ice, compression, and elevation (RICE) therapy.  Plain film x-rays.  Home physical therapy exercise handouts provided to patient.   Over the counter NSAID and/or tylenol provided you do not have contraindications such as but not limited to liver or kidney disease or uncontrolled blood pressure. If you're doctors have told you to to not take them based on your health, do not take them.   Imaging: radiological studies ordered and independently reviewed; discussed with patient; pending radiologist interpretation.   Procedure: Discussed CSI/VSI as treatment options; discussed injections as treatment options in future if conservative measures do not improve symptoms.  Activity: Activity as tolerated  Therapy: No formal therapy  Medication: CONTINUE previously prescribed medication gabapentinPrescribed lidocaine 5% patches for nerve pain in the left upper extremity related to suspect ulnar nerve impingement, and Medrol dosepak to try and control / relief symptoms. Please see your primary care physician for further refills.  RTC: After EMG/NCS completed.         Tang Mcconnell MD  Sports Medicine

## 2025-05-29 DIAGNOSIS — D64.9 ANEMIA OF UNKNOWN ETIOLOGY: Primary | ICD-10-CM

## 2025-07-09 DIAGNOSIS — R12 HEARTBURN: ICD-10-CM

## 2025-07-09 DIAGNOSIS — K21.9 GASTROESOPHAGEAL REFLUX DISEASE: Primary | ICD-10-CM

## 2025-07-15 ENCOUNTER — HOSPITAL ENCOUNTER (OUTPATIENT)
Dept: RADIOLOGY | Facility: HOSPITAL | Age: 55
Discharge: HOME | End: 2025-07-15
Attending: PHYSICIAN ASSISTANT
Payer: MEDICAID

## 2025-07-15 DIAGNOSIS — R12 HEARTBURN: ICD-10-CM

## 2025-07-15 DIAGNOSIS — K21.9 GASTROESOPHAGEAL REFLUX DISEASE: ICD-10-CM

## 2025-07-15 PROCEDURE — 74240 X-RAY XM UPR GI TRC 1CNTRST: CPT | Mod: TC

## 2025-07-15 PROCEDURE — 25500020 PHARM REV CODE 255

## 2025-07-15 PROCEDURE — A9698 NON-RAD CONTRAST MATERIALNOC: HCPCS

## 2025-07-15 RX ADMIN — BARIUM SULFATE: 980 POWDER, FOR SUSPENSION ORAL at 08:07

## 2025-07-15 RX ADMIN — BARIUM SULFATE 100 ML: 0.6 SUSPENSION ORAL at 08:07

## 2025-08-25 ENCOUNTER — OFFICE VISIT (OUTPATIENT)
Dept: ORTHOPEDICS | Facility: CLINIC | Age: 55
End: 2025-08-25
Payer: MEDICAID

## 2025-08-25 VITALS
OXYGEN SATURATION: 99 % | HEIGHT: 62 IN | HEART RATE: 68 BPM | TEMPERATURE: 98 F | SYSTOLIC BLOOD PRESSURE: 133 MMHG | BODY MASS INDEX: 53.92 KG/M2 | DIASTOLIC BLOOD PRESSURE: 79 MMHG | WEIGHT: 293 LBS | RESPIRATION RATE: 20 BRPM

## 2025-08-25 DIAGNOSIS — M65.4 DE QUERVAIN'S TENOSYNOVITIS, RIGHT: ICD-10-CM

## 2025-08-25 DIAGNOSIS — G56.01 RIGHT CARPAL TUNNEL SYNDROME: Primary | ICD-10-CM

## 2025-08-25 PROCEDURE — 3008F BODY MASS INDEX DOCD: CPT | Mod: CPTII,,, | Performed by: ORTHOPAEDIC SURGERY

## 2025-08-25 PROCEDURE — 99215 OFFICE O/P EST HI 40 MIN: CPT | Mod: PBBFAC

## 2025-08-25 PROCEDURE — 1159F MED LIST DOCD IN RCRD: CPT | Mod: CPTII,,, | Performed by: ORTHOPAEDIC SURGERY

## 2025-08-25 PROCEDURE — 3078F DIAST BP <80 MM HG: CPT | Mod: CPTII,,, | Performed by: ORTHOPAEDIC SURGERY

## 2025-08-25 PROCEDURE — 3075F SYST BP GE 130 - 139MM HG: CPT | Mod: CPTII,,, | Performed by: ORTHOPAEDIC SURGERY

## 2025-08-25 RX ORDER — CEFAZOLIN SODIUM 2 G/50ML
2 SOLUTION INTRAVENOUS
OUTPATIENT
Start: 2025-08-25

## 2025-08-25 RX ORDER — SODIUM CHLORIDE 9 MG/ML
INJECTION, SOLUTION INTRAVENOUS CONTINUOUS
OUTPATIENT
Start: 2025-08-25 | End: 2025-08-25

## 2025-08-25 RX ORDER — MUPIROCIN 20 MG/G
OINTMENT TOPICAL
OUTPATIENT
Start: 2025-08-25

## (undated) DEVICE — SUT SA85H SILK 2-0

## (undated) DEVICE — CHARM MARGINMAP SPEC 5MM

## (undated) DEVICE — SUT 2/0 30IN SILK BLK BRAI

## (undated) DEVICE — GLOVE PROTEXIS BLUE LATEX 7

## (undated) DEVICE — COVER SITE-RITE PROBE 96IN

## (undated) DEVICE — GLOVE SIGNATURE MICRO LTX 6.5

## (undated) DEVICE — SYR 10CC LUER LOCK

## (undated) DEVICE — SUT 3-0 MONOCRYL PLUS PS-2

## (undated) DEVICE — ELECTRODE REM POLYHESIVE II

## (undated) DEVICE — PAD ABDOMINAL STERILE 8X10IN

## (undated) DEVICE — DRESSING GAUZE XEROFORM 5X9

## (undated) DEVICE — ADHESIVE DERMABOND ADVANCED

## (undated) DEVICE — YANKAUER FLEX NO VENT REG CAP

## (undated) DEVICE — STAPLER SKIN ROTATING HEAD

## (undated) DEVICE — CORD BIPOLAR 12 FOOT

## (undated) DEVICE — GLOVE SENSICARE PI GRN 7.5

## (undated) DEVICE — APPLICATOR CHLORAPREP ORN 26ML

## (undated) DEVICE — PADDING WYTEX UNDRCST 6INX4YD

## (undated) DEVICE — Device

## (undated) DEVICE — ALCOHOL 70% ISO RUBBING 16OZ

## (undated) DEVICE — SUT 3-0 VICRYL / SH (J416)

## (undated) DEVICE — BRA MAMMARY SUPPORT XLARGE

## (undated) DEVICE — MARKER WRITESITE SKIN CHLRAPRP

## (undated) DEVICE — BANDAGE VELCLOSE ELAS 4INX5YD

## (undated) DEVICE — TOURNIQUET SB QC DP 24X4IN

## (undated) DEVICE — TIP SUCTION YANKAUER

## (undated) DEVICE — MANIFOLD 4 PORT

## (undated) DEVICE — SUT ETHILON 4-0 BLK MONO

## (undated) DEVICE — TUBING MEDI-VAC 20FT .25IN

## (undated) DEVICE — SOL IRRI STRL WATER 1000ML

## (undated) DEVICE — GLOVE SENSICARE PI GRN 6

## (undated) DEVICE — GLOVE SIGNATURE MICRO LTX 7.5

## (undated) DEVICE — KIT SURGICAL TURNOVER

## (undated) DEVICE — BLADE SURG STAINLESS STEEL #15

## (undated) DEVICE — HANDLE DEVON RIGID OR LIGHT

## (undated) DEVICE — GAUZE SPONGE 4X4 12PLY

## (undated) DEVICE — PAD CAST SPECIALIST STRL 4

## (undated) DEVICE — DRAPE HAND STERILE

## (undated) DEVICE — GLOVE PROTEXIS LTX MICRO 8

## (undated) DEVICE — BLADE SURG STAINLESS STEEL #10

## (undated) DEVICE — GLOVE SENSICARE PI GRN 6.5

## (undated) DEVICE — KIT BASIC ORTHO UNIVERSITY

## (undated) DEVICE — STRIP MEDI WND CLSR 1/2X4IN

## (undated) DEVICE — SOL 9P NACL IRR PIC IL

## (undated) DEVICE — GLOVE PROTEXIS LTX MICRO 6

## (undated) DEVICE — PILLCAM SB3 EX EXTENDED

## (undated) DEVICE — SUT MCRYL PLUS 4-0 PS2 27IN

## (undated) DEVICE — GOWN POLY REINF BRTH SLV XL

## (undated) DEVICE — ELECTRODE BLADE INSULATED 1 IN

## (undated) DEVICE — NDL HYPO REG 25G X 1 1/2

## (undated) DEVICE — DEVICE SPECIMEN TRANSPEC

## (undated) DEVICE — GLOVE PROTEXIS BLUE LATEX 8